# Patient Record
Sex: MALE | Race: WHITE | NOT HISPANIC OR LATINO | Employment: FULL TIME | URBAN - METROPOLITAN AREA
[De-identification: names, ages, dates, MRNs, and addresses within clinical notes are randomized per-mention and may not be internally consistent; named-entity substitution may affect disease eponyms.]

---

## 2017-02-02 ENCOUNTER — GENERIC CONVERSION - ENCOUNTER (OUTPATIENT)
Dept: OTHER | Facility: OTHER | Age: 57
End: 2017-02-02

## 2017-02-08 ENCOUNTER — LAB CONVERSION - ENCOUNTER (OUTPATIENT)
Dept: OTHER | Facility: OTHER | Age: 57
End: 2017-02-08

## 2017-02-08 PROCEDURE — 88305 TISSUE EXAM BY PATHOLOGIST: CPT | Performed by: INTERNAL MEDICINE

## 2017-02-08 PROCEDURE — 88342 IMHCHEM/IMCYTCHM 1ST ANTB: CPT | Performed by: INTERNAL MEDICINE

## 2017-02-09 ENCOUNTER — LAB REQUISITION (OUTPATIENT)
Dept: LAB | Facility: HOSPITAL | Age: 57
End: 2017-02-09
Payer: COMMERCIAL

## 2017-02-09 DIAGNOSIS — K21.9 GASTRO-ESOPHAGEAL REFLUX DISEASE WITHOUT ESOPHAGITIS: ICD-10-CM

## 2017-02-09 DIAGNOSIS — K29.70 GASTRITIS WITHOUT BLEEDING: ICD-10-CM

## 2017-02-09 DIAGNOSIS — K20.90 ESOPHAGITIS: ICD-10-CM

## 2017-02-15 ENCOUNTER — GENERIC CONVERSION - ENCOUNTER (OUTPATIENT)
Dept: OTHER | Facility: OTHER | Age: 57
End: 2017-02-15

## 2017-03-07 ENCOUNTER — LAB REQUISITION (OUTPATIENT)
Dept: LAB | Facility: HOSPITAL | Age: 57
End: 2017-03-07
Payer: COMMERCIAL

## 2017-03-07 ENCOUNTER — ALLSCRIPTS OFFICE VISIT (OUTPATIENT)
Dept: OTHER | Facility: OTHER | Age: 57
End: 2017-03-07

## 2017-03-07 DIAGNOSIS — E55.9 VITAMIN D DEFICIENCY: ICD-10-CM

## 2017-03-07 DIAGNOSIS — K21.9 GASTRO-ESOPHAGEAL REFLUX DISEASE WITHOUT ESOPHAGITIS: ICD-10-CM

## 2017-03-07 DIAGNOSIS — E78.5 HYPERLIPIDEMIA: ICD-10-CM

## 2017-03-07 DIAGNOSIS — E66.9 OBESITY: ICD-10-CM

## 2017-03-07 LAB
25(OH)D3 SERPL-MCNC: 34 NG/ML (ref 30–100)
ALBUMIN SERPL BCP-MCNC: 3.9 G/DL (ref 3.5–5)
ALP SERPL-CCNC: 66 U/L (ref 46–116)
ALT SERPL W P-5'-P-CCNC: 42 U/L (ref 12–78)
ANION GAP SERPL CALCULATED.3IONS-SCNC: 6 MMOL/L (ref 4–13)
AST SERPL W P-5'-P-CCNC: 16 U/L (ref 5–45)
BASOPHILS # BLD AUTO: 0.02 THOUSANDS/ΜL (ref 0–0.1)
BASOPHILS NFR BLD AUTO: 0 % (ref 0–1)
BILIRUB SERPL-MCNC: 0.43 MG/DL (ref 0.2–1)
BILIRUB UR QL STRIP: NEGATIVE
BUN SERPL-MCNC: 16 MG/DL (ref 5–25)
CALCIUM SERPL-MCNC: 9.2 MG/DL (ref 8.3–10.1)
CHLORIDE SERPL-SCNC: 107 MMOL/L (ref 100–108)
CHOLEST SERPL-MCNC: 158 MG/DL (ref 50–200)
CLARITY UR: CLEAR
CO2 SERPL-SCNC: 29 MMOL/L (ref 21–32)
COLOR UR: YELLOW
CREAT SERPL-MCNC: 1.01 MG/DL (ref 0.6–1.3)
EOSINOPHIL # BLD AUTO: 0.23 THOUSAND/ΜL (ref 0–0.61)
EOSINOPHIL NFR BLD AUTO: 4 % (ref 0–6)
ERYTHROCYTE [DISTWIDTH] IN BLOOD BY AUTOMATED COUNT: 12 % (ref 11.6–15.1)
GFR SERPL CREATININE-BSD FRML MDRD: >60 ML/MIN/1.73SQ M
GLUCOSE SERPL-MCNC: 91 MG/DL (ref 65–140)
GLUCOSE UR STRIP-MCNC: NEGATIVE MG/DL
HCT VFR BLD AUTO: 41 % (ref 36.5–49.3)
HDLC SERPL-MCNC: 60 MG/DL (ref 40–60)
HGB BLD-MCNC: 14.9 G/DL (ref 12–17)
HGB UR QL STRIP.AUTO: NEGATIVE
KETONES UR STRIP-MCNC: NEGATIVE MG/DL
LDLC SERPL CALC-MCNC: 79 MG/DL (ref 0–100)
LEUKOCYTE ESTERASE UR QL STRIP: NEGATIVE
LYMPHOCYTES # BLD AUTO: 2.32 THOUSANDS/ΜL (ref 0.6–4.47)
LYMPHOCYTES NFR BLD AUTO: 35 % (ref 14–44)
MCH RBC QN AUTO: 31.7 PG (ref 26.8–34.3)
MCHC RBC AUTO-ENTMCNC: 36.3 G/DL (ref 31.4–37.4)
MCV RBC AUTO: 87 FL (ref 82–98)
MONOCYTES # BLD AUTO: 0.59 THOUSAND/ΜL (ref 0.17–1.22)
MONOCYTES NFR BLD AUTO: 9 % (ref 4–12)
NEUTROPHILS # BLD AUTO: 3.44 THOUSANDS/ΜL (ref 1.85–7.62)
NEUTS SEG NFR BLD AUTO: 52 % (ref 43–75)
NITRITE UR QL STRIP: NEGATIVE
NRBC BLD AUTO-RTO: 0 /100 WBCS
PH UR STRIP.AUTO: 6.5 [PH] (ref 4.5–8)
PLATELET # BLD AUTO: 225 THOUSANDS/UL (ref 149–390)
PMV BLD AUTO: 10.6 FL (ref 8.9–12.7)
POTASSIUM SERPL-SCNC: 4.4 MMOL/L (ref 3.5–5.3)
PROT SERPL-MCNC: 7.2 G/DL (ref 6.4–8.2)
PROT UR STRIP-MCNC: NEGATIVE MG/DL
PSA SERPL-MCNC: 0.8 NG/ML (ref 0–4)
RBC # BLD AUTO: 4.7 MILLION/UL (ref 3.88–5.62)
SODIUM SERPL-SCNC: 142 MMOL/L (ref 136–145)
SP GR UR STRIP.AUTO: 1.01 (ref 1–1.03)
TRIGL SERPL-MCNC: 97 MG/DL
TSH SERPL DL<=0.05 MIU/L-ACNC: 1.39 UIU/ML (ref 0.36–3.74)
UROBILINOGEN UR QL STRIP.AUTO: 0.2 E.U./DL
WBC # BLD AUTO: 6.62 THOUSAND/UL (ref 4.31–10.16)

## 2017-03-07 PROCEDURE — 84443 ASSAY THYROID STIM HORMONE: CPT | Performed by: FAMILY MEDICINE

## 2017-03-07 PROCEDURE — 81003 URINALYSIS AUTO W/O SCOPE: CPT | Performed by: FAMILY MEDICINE

## 2017-03-07 PROCEDURE — G0103 PSA SCREENING: HCPCS | Performed by: FAMILY MEDICINE

## 2017-03-07 PROCEDURE — 85025 COMPLETE CBC W/AUTO DIFF WBC: CPT | Performed by: FAMILY MEDICINE

## 2017-03-07 PROCEDURE — 80061 LIPID PANEL: CPT | Performed by: FAMILY MEDICINE

## 2017-03-07 PROCEDURE — 82306 VITAMIN D 25 HYDROXY: CPT | Performed by: FAMILY MEDICINE

## 2017-03-07 PROCEDURE — 80053 COMPREHEN METABOLIC PANEL: CPT | Performed by: FAMILY MEDICINE

## 2017-03-08 ENCOUNTER — GENERIC CONVERSION - ENCOUNTER (OUTPATIENT)
Dept: OTHER | Facility: OTHER | Age: 57
End: 2017-03-08

## 2018-01-08 ENCOUNTER — ALLSCRIPTS OFFICE VISIT (OUTPATIENT)
Dept: OTHER | Facility: OTHER | Age: 58
End: 2018-01-08

## 2018-01-09 NOTE — PROGRESS NOTES
Assessment   1  GERD without esophagitis (530 81) (K21 9)   2  Hyperlipidemia (272 4) (E78 5)   3  Vitamin D deficiency (268 9) (E55 9)   4  Acute URI (465 9) (J06 9)    Plan   Acute URI    · Zithromax Z-Beau 250 MG Oral Tablet (Azithromycin); TAKE 2 TABLETS ON DAY 1    THEN TAKE 1 TABLET A DAY FOR 4 DAYS  Encounter for prostate cancer screening, GERD without esophagitis, Hyperlipidemia,    Vitamin D deficiency    · (1) PSA (SCREEN) (Dx V76 44 Screen for Prostate Cancer); Status:Active; Requested    for:08Mar2018;   GERD without esophagitis, Hyperlipidemia, Vitamin D deficiency    · (1) CBC/PLT/DIFF; Status:Active; Requested for:08Mar2018;    · (1) COMPREHENSIVE METABOLIC PANEL; Status:Active; Requested for:08Mar2018;    · (1) LIPID PANEL, FASTING; Status:Active; Requested for:08Mar2018;    · (1) TSH WITH FT4 REFLEX; Status:Active; Requested for:08Mar2018;    · (1) URINALYSIS (will reflex a microscopy if leukocytes, occult blood, protein or nitrites are    not within normal limits); Status:Active; Requested for:08Mar2018;    · (1) VITAMIN D 25-HYDROXY; Status:Active; Requested for:08Mar2018;   Hyperlipidemia    · Atorvastatin Calcium 20 MG Oral Tablet; TAKE 1 TABLET DAILY AS DIRECTED   · A diet that is low in fat, cholesterol, and sodium is considered a cardiac diet ;    Status:Complete;   Done: 72FUS4684   · Begin a limited exercise program ; Status:Complete;   Done: 25NCQ7918   · Begin or continue regular aerobic exercise  Gradually work up to at least 3 sessions of    30 minutes of exercise a week ; Status:Complete;   Done: 56JSW3412   · Continue with our present treatment plan ; Status:Complete;   Done: 97FSQ2267   · Eat a low fat and low cholesterol diet ; Status:Complete;   Done: 27VKY1975   · Eat no more than 30 grams of fat per day ; Status:Complete;   Done: 87XNZ1463   · We recommend you modify your diet to achieve and maintain a healthy weight    Being    overweight may increase your risk for developing health problems such as diabetes,    heart disease, and cancer  Avoid high fat foods and eat a balanced diet rich    in fruits and vegetables  The combination of a reduced-calorie diet and increased    physical activity is recommended  Please let us know if you would like to    learn more about your nutrition and calorie needs, and additional options including    weight loss programs that can help you achieve your goals ; Status:Complete;   Done:    76LHR3713    Discussion/Summary      Patient declines flu vaccine today  Patient to return to the office in 2 months for full fasting labs  Patient was started on a Z-Beau and instructed to take Robitussin DM or Mucinex DM p r n  He is encouraged to drink plenty of fluids and rest  Patient to continue his present treatment  Patient instructed to follow a low-fat and a low-salt diet and get regular aerobic exercise 150 minutes per week  Patient to return to the office in 1 year  Possible side effects of new medications were reviewed with the patient/guardian today  The treatment plan was reviewed with the patient/guardian  The patient/guardian understands and agrees with the treatment plan      Chief Complaint   Fasting [facility]    Patient is here today for follow up of chronic conditions described in HPI  History of Present Illness   Sandra Concepcion presents with complaints of cold symptoms  Associated symptoms include post nasal drainage,-- hoarseness,-- dry cough,-- headache-- and-- chills, but-- no sore throat,-- no productive cough,-- no facial pressure,-- no facial pain,-- no ear pain,-- no wheezing,-- no shortness of breath,-- no fatigue,-- no nausea,-- no vomiting-- and-- no diarrhea  The patient presents with complaints of fever, described as > 99 f  Patient is here for routine appointment for chronic conditions and fasting labs  Patient has been feeling well overall until recently developed cold symptoms past 2 days   Patient has been exercising 2 to 3 times a week for 30 minutes primarily weight training  Patient's weight is down approximately 20 lb over the past 2 years through diet and exercise  Patient is up-to-date on colonoscopy  Patient declines flu vaccine  The patient reports doing well  He has had no significant interval events  Interval symptoms:  denies heartburn,-- denies chest pain,-- denies abdominal pain,-- denies acid regurgitation-- and-- denies dysphagia  Associated symptoms: no nausea,-- no vomiting,-- no hematemesis-- and-- no melena--       The patient presents with complaints of 10 - 19 pound weight loss  Medications:  the patient is adherent to his medication regimen, but-- he denies medication side effects  Disease management:  the patient is doing well with his goals  The patient states his hyperlipidemia has been under good control since the last visit  He has no comorbid illnesses  He has no significant interval events  Symptoms: denies chest pain,-- denies intermittent leg claudication,-- denies muscle pain-- and-- denies muscle weakness  Associated symptoms include no memory loss  Medications: the patient is adherent with his medication regimen  -- He denies medication side effects  The patient is doing well with his hyperlipidemia goals  the patient's last LDL was 79 mg/dL  The patient is due for a lipid panel-- and-- liver function tests  The patient is being seen for follow-up of vitamin D deficiency  Disease type: vitamin D deficiency  Current treatment includes dietary vitamin D, dietary calcium, weight bearing exercise and vitamin D3 (cholecalciferol)  Symptoms:  no fatigue,-- no bone pain,-- no muscle pain,-- no muscle weakness,-- no muscle cramps,-- no paresthesias-- and-- no gait abnormality  The patient is currently experiencing symptoms  Review of Systems        Eyes: No complaints of eye pain, no red eyes, no discharge from eyes, no itchy eyes        Cardiovascular: no chest pain,-- no intermittent leg claudication,-- no palpitations-- and-- no extremity edema  Genitourinary: no dysuria,-- no urinary hesitancy-- and-- no nocturia  Hematologic/Lymphatic: No complaints of swollen glands, no swollen glands in the neck, does not bleed easily, no easy bruising  Active Problems   1  Abnormal liver function test (790 6) (R79 89)   2  Fever (780 60) (R50 9)   3  GERD without esophagitis (530 81) (K21 9)   4  Hyperlipidemia (272 4) (E78 5)   5  Obesity (278 00) (E66 9)   6  Renal cyst, right (753 10) (N28 1)   7  Tinea versicolor (111 0) (B36 0)   8  Vitamin D deficiency (268 9) (E55 9)    Family History   Mother    1  Family history of Coronary Artery Disease (V17 49)  Father    2  Family history of Colon Cancer (V16 0)   3  Family history of Father  At Age 46    Social History    · Being A Social Drinker   · Never a smoker   · Never A Smoker    Current Meds    1  Aspirin 81 MG Oral Tablet Chewable; 1 qd; Therapy: 35OFB3751 to Recorded   2  Atorvastatin Calcium 20 MG Oral Tablet; TAKE 1 TABLET DAILY AS DIRECTED; Therapy: 73ESE8877 to (Evaluate:2018)  Requested for: 33EBJ2868; Last     Rx:2017; Status: ACTIVE - Renewal Denied Ordered   3  Econazole Nitrate 1 % External Cream; APPLY AND GENTLY MASSAGE INTO AFFECTED     AREA(S) TWICE DAILY; Therapy: 68LHL8568 to (Last Rx:2017)  Requested for: 93SIQ0680 Ordered   4  Fish Oil 1200 MG Oral Capsule; 1 qd; Therapy: 80ZPA9119 to Recorded   5  Multivitamin TABS; Therapy: (Recorded:2015) to Recorded   6  Omeprazole 20 MG Oral Capsule Delayed Release; Therapy: (21 ) to Recorded   7  Vitamin D3 2000 UNIT Oral Capsule; Therapy: (Recorded:2017) to Recorded    Allergies   1   Sulfa Drugs    Vitals   Vital Signs    Recorded: 91NXR3697 09:28AM Recorded: 42EJS4983 09:04AM   Temperature  99 7 F   Heart Rate 60    Respiration 16    Systolic 665    Diastolic 82 Height  5 ft 7 in   Weight  194 lb    BMI Calculated  30 38   BSA Calculated  2     Physical Exam        Constitutional      General appearance: No acute distress, well appearing and well nourished  Eyes      Conjunctiva and lids: No swelling, erythema, or discharge  Ears, Nose, Mouth, and Throat      External inspection of ears and nose: Normal        Otoscopic examination: Tympanic membrance translucent with normal light reflex  Canals patent without erythema  Nasal mucosa, septum, and turbinates: Abnormal   There was a mucoid discharge from both nares  The bilateral nasal mucosa was edematous  Oropharynx: Abnormal  -- Postnasal drainage and injected  Pulmonary      Respiratory effort: No increased work of breathing or signs of respiratory distress  Auscultation of lungs: Clear to auscultation, equal breath sounds bilaterally, no wheezes, no rales, no rhonci  Cardiovascular      Auscultation of heart: Normal rate and rhythm, normal S1 and S2, without murmurs  Examination of extremities for edema and/or varicosities: Normal        Carotid pulses: Normal        Abdomen      Abdomen: Non-tender, no masses  Lymphatic      Palpation of lymph nodes in neck: No lymphadenopathy  Musculoskeletal      Gait and station: Normal        Inspection/palpation of joints, bones, and muscles: Normal        Skin      Skin and subcutaneous tissue: Normal without rashes or lesions  Psychiatric      Orientation to person, place and time: Normal        Mood and affect: Normal           Health Management   Health Maintenance   COLONOSCOPY; every 5 years; Last 50ZXI7115; Next Due: 74WKC3445;  Active    Signatures    Electronically signed by : Philipp Brown DO; Jan 8 2018  9:38AM EST                       (Author)

## 2018-01-12 NOTE — RESULT NOTES
Verified Results  (1) PSA (SCREEN) (Dx V76 44 Screen for Prostate Cancer) 14Apr2016 09:01AM Fabián Puri Order Number: [id number]     Order Number: [id number]     Test Name Result Flag Reference   PROSTATE SPECIFIC ANTIGEN 0 9 ng/mL  0 0-4 0     (1) CBC/PLT/DIFF 11FZK1093 09:01AM Navi Benz     Test Name Result Flag Reference   WBC COUNT 5 80 Thousand/uL  4 31-10 16   RBC COUNT 4 93 Million/uL  3 88-5 62   HEMOGLOBIN 15 9 g/dL  12 0-17 0   HEMATOCRIT 44 0 %  36 5-49 3   MCV 89 fL  82-98   MCH 32 3 pg  26 8-34 3   MCHC 36 1 g/dL  31 4-37 4   RDW 12 6 %  11 6-15 1   MPV 10 7 fL  8 9-12 7   PLATELET COUNT 055 Thousands/uL  149-390   nRBC AUTOMATED 0 /100 WBCs     NEUTROPHILS RELATIVE PERCENT 56 %  43-75   LYMPHOCYTES RELATIVE PERCENT 31 %  14-44   MONOCYTES RELATIVE PERCENT 10 %  4-12   EOSINOPHILS RELATIVE PERCENT 3 %  0-6   BASOPHILS RELATIVE PERCENT 0 %  0-1   NEUTROPHILS ABSOLUTE COUNT 3 18 Thousands/µL  1 85-7 62   LYMPHOCYTES ABSOLUTE COUNT 1 81 Thousands/µL  0 60-4 47   MONOCYTES ABSOLUTE COUNT 0 58 Thousand/µL  0 17-1 22   EOSINOPHILS ABSOLUTE COUNT 0 20 Thousand/µL  0 00-0 61   BASOPHILS ABSOLUTE COUNT 0 02 Thousands/µL  0 00-0 10     (1) URINALYSIS (will reflex a microscopy if leukocytes, occult blood, protein or nitrites are not within normal limits) 14Apr2016 09:01AM Navi Benz     Test Name Result Flag Reference   COLOR Yellow     CLARITY Cloudy     SPECIFIC GRAVITY UA 1 021  1 003-1 030   PH UA 6 5  4 5-8 0   LEUKOCYTE ESTERASE UA Negative  Negative   NITRITE UA Negative  Negative   PROTEIN UA Negative mg/dl  Negative   GLUCOSE UA Negative mg/dl  Negative   KETONES UA Negative mg/dl  Negative   UROBILINOGEN UA 0 2 E U /dl  0 2, 1 0 E U /dl   BILIRUBIN UA Negative  Negative   BLOOD UA Negative  Negative     (1) TSH WITH FT4 REFLEX 14Apr2016 09:01AM Navi Benz   Patients undergoing fluorescein dye angiography may retain small amounts of fluorescein in the body for 48-72 hours post procedure  Samples containing fluorescein can produce falsely depressed TSH values  If the patient had this procedure,a specimen should be resubmitted post fluorescein clearance  Test Name Result Flag Reference   TSH 2 310 uIU/mL  0 358-3 740     (1) LIPID PANEL, FASTING 14Apr2016 09:01AM Gautam Morris   Triglyceride:         Normal              <150 mg/dl       Borderline High    150-199 mg/dl       High               200-499 mg/dl       Very High          >499 mg/dl  Cholesterol:         Desirable        <200 mg/dl      Borderline High  200-239 mg/dl      High             >239 mg/dl  HDL Cholesterol:        High    >59 mg/dL      Low     <41 mg/dL  LDL CALCULATED:    This screening LDL is a calculated result  It does not have the accuracy of the Direct Measured LDL in the monitoring of patients with hyperlipidemia and/or statin therapy  Direct Measure LDL (KHD325) must be ordered separately in these patients  Test Name Result Flag Reference   CHOLESTEROL 180 mg/dL     HDL,DIRECT 57 mg/dL  40-60   E550   LDL CHOLESTEROL CALCULATED 91 mg/dL  0-100   TRIGLYCERIDES 158 mg/dL H <=150   E511     (1) COMPREHENSIVE METABOLIC PANEL 11IJW2723 89:44TG Gautam Morris   [facility] Kidney Disease Education Program recommendations are as follows:  GFR calculation is accurate only with a steady state creatinine  Chronic Kidney disease less than 60 ml/min/1 73 sq  meters  Kidney failure less than 15 ml/min/1 73 sq  meters  Test Name Result Flag Reference   GLUCOSE,RANDM 91 mg/dL     If the patient is fasting, the ADA then defines impaired fasting glucose as > 100 mg/dL and diabetes as > or equal to 123 mg/dL     SODIUM 143 mmol/L  136-145   POTASSIUM 4 3 mmol/L  3 5-5 3   CHLORIDE 107 mmol/L  100-108   CARBON DIOXIDE 26 mmol/L  21-32   ANION GAP (CALC) 10 mmol/L  4-13   BLOOD UREA NITROGEN 17 mg/dL  5-25   CREATININE 0 98 mg/dL  0 60-1 30   Standardized to IDMS reference method   CALCIUM 9 0 mg/dL  8 3-10 1   BILI, TOTAL 0 54 mg/dL  0 20-1 00   ALK PHOSPHATAS 67 U/L     ALT (SGPT) 42 U/L  12-78   AST(SGOT) 17 U/L  5-45   ALBUMIN 4 2 g/dL  3 5-5 0   TOTAL PROTEIN 7 5 g/dL  6 4-8 2   eGFR Non-African American      >60 0 ml/min/1 73sq m     (1) VITAMIN D 25-HYDROXY 14Apr2016 09:01AM Carol Pang     Test Name Result Flag Reference   VIT D 25-HYDROX 28 9 ng/mL L 30 0-100 0       Discussion/Summary   Labs okay  Continue present treatment

## 2018-01-14 VITALS
DIASTOLIC BLOOD PRESSURE: 82 MMHG | RESPIRATION RATE: 16 BRPM | HEIGHT: 67 IN | TEMPERATURE: 98.1 F | HEART RATE: 56 BPM | SYSTOLIC BLOOD PRESSURE: 134 MMHG | BODY MASS INDEX: 32.18 KG/M2 | WEIGHT: 205 LBS

## 2018-01-14 NOTE — RESULT NOTES
Verified Results  (1) CBC/PLT/DIFF [id number] 09:14AM Mi Coronado Order Number: [id number]     Test Name Result Flag Reference   WBC COUNT 6 62 Thousand/uL  4 31-10 16   RBC COUNT 4 70 Million/uL  3 88-5 62   HEMOGLOBIN 14 9 g/dL  12 0-17 0   HEMATOCRIT 41 0 %  36 5-49 3   MCV 87 fL  82-98   MCH 31 7 pg  26 8-34 3   MCHC 36 3 g/dL  31 4-37 4   RDW 12 0 %  11 6-15 1   MPV 10 6 fL  8 9-12 7   PLATELET COUNT 106 Thousands/uL  149-390   nRBC AUTOMATED 0 /100 WBCs     NEUTROPHILS RELATIVE PERCENT 52 %  43-75   LYMPHOCYTES RELATIVE PERCENT 35 %  14-44   MONOCYTES RELATIVE PERCENT 9 %  4-12   EOSINOPHILS RELATIVE PERCENT 4 %  0-6   BASOPHILS RELATIVE PERCENT 0 %  0-1   NEUTROPHILS ABSOLUTE COUNT 3 44 Thousands/? ??L  1 85-7 62   LYMPHOCYTES ABSOLUTE COUNT 2 32 Thousands/? ??L  0 60-4 47   MONOCYTES ABSOLUTE COUNT 0 59 Thousand/? ??L  0 17-1 22   EOSINOPHILS ABSOLUTE COUNT 0 23 Thousand/? ??L  0 00-0 61   BASOPHILS ABSOLUTE COUNT 0 02 Thousands/? ??L  0 00-0 10   - Patient Instructions: This bloodwork is non-fasting  Please drink two glasses of water morning of bloodwork  (1) COMPREHENSIVE METABOLIC PANEL [id number] 92:19CZ Mi Coronado Order Number: [id number]     Test Name Result Flag Reference   GLUCOSE,RANDM 91 mg/dL     If the patient is fasting, the ADA then defines impaired fasting glucose as > 100 mg/dL and diabetes as > or equal to 123 mg/dL     SODIUM 142 mmol/L  136-145   POTASSIUM 4 4 mmol/L  3 5-5 3   CHLORIDE 107 mmol/L  100-108   CARBON DIOXIDE 29 mmol/L  21-32   ANION GAP (CALC) 6 mmol/L  4-13   BLOOD UREA NITROGEN 16 mg/dL  5-25   CREATININE 1 01 mg/dL  0 60-1 30   Standardized to IDMS reference method   CALCIUM 9 2 mg/dL  8 3-10 1   BILI, TOTAL 0 43 mg/dL  0 20-1 00   ALK PHOSPHATAS 66 U/L     ALT (SGPT) 42 U/L  12-78   AST(SGOT) 16 U/L  5-45   ALBUMIN 3 9 g/dL  3 5-5 0   TOTAL PROTEIN 7 2 g/dL  6 4-8 2   eGFR Non-African American      >60 0 ml/min/1 73sq m National Kidney Disease Education Program recommendations are as follows:  GFR calculation is accurate only with a steady state creatinine  Chronic Kidney disease less than 60 ml/min/1 73 sq  meters  Kidney failure less than 15 ml/min/1 73 sq  meters  (1) LIPID PANEL, FASTING [id number] 09:14AM [facility] Order Number: [id number]     Test Name Result Flag Reference   CHOLESTEROL 158 mg/dL     HDL,DIRECT 60 mg/dL  40-60   Specimen collection should occur prior to Metamizole administration due to the potential for falsely depressed results  LDL CHOLESTEROL CALCULATED 79 mg/dL  0-100   - Patient Instructions: This is a fasting blood test  Water,black tea or black  coffee only after 9:00pm the night before test   Drink 2 glasses of water the morning of test       Triglyceride:         Normal              <150 mg/dl       Borderline High    150-199 mg/dl       High               200-499 mg/dl       Very High          >499 mg/dl  Cholesterol:         Desirable        <200 mg/dl      Borderline High  200-239 mg/dl      High             >239 mg/dl  HDL Cholesterol:        High    >59 mg/dL      Low     <41 mg/dL  LDL CALCULATED:    This screening LDL is a calculated result  It does not have the accuracy of the Direct Measured LDL in the monitoring of patients with hyperlipidemia and/or statin therapy  Direct Measure LDL (CGY908) must be ordered separately in these patients  TRIGLYCERIDES 97 mg/dL  <=150   Specimen collection should occur prior to N-Acetylcysteine or Metamizole administration due to the potential for falsely depressed results  (1) TSH WITH FT4 REFLEX [id number] 09:14AM [facility] [employer] Order Number: [id number]     Test Name Result Flag Reference   TSH 1 390 uIU/mL  0 358-3 740   Patients undergoing fluorescein dye angiography may retain small amounts of fluorescein in the body for 48-72 hours post procedure   Samples containing fluorescein can produce falsely depressed TSH values  If the patient had this procedure,a specimen should be resubmitted post fluorescein clearance  (1) VITAMIN D 25-HYDROXY [id number] 09:14AM [facility] Order Number: [id number]     Test Name Result Flag Reference   VIT D 25-HYDROX 34 0 ng/mL  30 0-100 0   This assay is a certified procedure of the CDC Vitamin D Standardization Certification Program (VDSCP)     Deficiency <20ng/ml   Insufficiency 20-30ng/ml   Sufficient  ng/ml     *Patients undergoing fluorescein dye angiography may retain small amounts of fluorescein in the body for 48-72 hours post procedure  Samples containing fluorescein can produce falsely elevated Vitamin D values  If the patient had this procedure, a specimen should be resubmitted post fluorescein clearance  (1) URINALYSIS (will reflex a microscopy if leukocytes, occult blood, protein or nitrites are not within normal limits) [id number] 09:14AM [facility] Order Number: [id number]     Test Name Result Flag Reference   COLOR Yellow     CLARITY Clear     SPECIFIC GRAVITY UA 1 015  1 003-1 030   PH UA 6 5  4 5-8 0   LEUKOCYTE ESTERASE UA Negative  Negative   NITRITE UA Negative  Negative   PROTEIN UA Negative mg/dl  Negative   GLUCOSE UA Negative mg/dl  Negative   KETONES UA Negative mg/dl  Negative   UROBILINOGEN UA 0 2 E U /dl  0 2, 1 0 E U /dl   BILIRUBIN UA Negative  Negative   BLOOD UA Negative  Negative     (1) PSA (SCREEN) (Dx V76 44 Screen for Prostate Cancer) [id number] 09:14AM [facility] Order Number: [id number]     Test Name Result Flag Reference   PROSTATE SPECIFIC ANTIGEN 0 8 ng/mL  0 0-4 0   American Urological Association Guidelines define biochemical recurrence of prostate cancer as a detectable or rising PSA value post-radical prostatectomy that is greater than or equal to 0 2 ng/mL with a second confirmatory level of greater than or equal to 0 2 ng/mL     - Patient Instructions: This test is non-fasting  Please drink two glasses of water morning of bloodwork  Discussion/Summary   Labs normal  Cont present Tx

## 2018-01-23 VITALS
TEMPERATURE: 99.7 F | DIASTOLIC BLOOD PRESSURE: 82 MMHG | WEIGHT: 194 LBS | HEART RATE: 60 BPM | RESPIRATION RATE: 16 BRPM | SYSTOLIC BLOOD PRESSURE: 132 MMHG | BODY MASS INDEX: 30.45 KG/M2 | HEIGHT: 67 IN

## 2018-03-08 DIAGNOSIS — Z12.5 ENCOUNTER FOR SCREENING FOR MALIGNANT NEOPLASM OF PROSTATE: ICD-10-CM

## 2018-03-08 DIAGNOSIS — E55.9 VITAMIN D DEFICIENCY: ICD-10-CM

## 2018-03-08 DIAGNOSIS — E78.5 HYPERLIPIDEMIA: ICD-10-CM

## 2018-03-08 DIAGNOSIS — K21.9 GASTRO-ESOPHAGEAL REFLUX DISEASE WITHOUT ESOPHAGITIS: ICD-10-CM

## 2018-04-20 ENCOUNTER — TELEPHONE (OUTPATIENT)
Dept: FAMILY MEDICINE CLINIC | Facility: CLINIC | Age: 58
End: 2018-04-20

## 2019-01-06 DIAGNOSIS — E78.5 HYPERLIPIDEMIA, UNSPECIFIED HYPERLIPIDEMIA TYPE: Primary | ICD-10-CM

## 2019-01-06 RX ORDER — ATORVASTATIN CALCIUM 20 MG/1
TABLET, FILM COATED ORAL
Qty: 90 TABLET | Refills: 3 | Status: SHIPPED | OUTPATIENT
Start: 2019-01-06 | End: 2020-01-03

## 2019-01-21 RX ORDER — OMEPRAZOLE 20 MG/1
CAPSULE, DELAYED RELEASE ORAL
COMMUNITY
End: 2019-01-24 | Stop reason: SDUPTHER

## 2019-01-21 RX ORDER — ACETAMINOPHEN 160 MG
3000 TABLET,DISINTEGRATING ORAL
COMMUNITY

## 2019-01-21 RX ORDER — OMEPRAZOLE 20 MG/1
CAPSULE, DELAYED RELEASE ORAL
COMMUNITY
Start: 2018-11-26 | End: 2021-01-28

## 2019-01-21 RX ORDER — ELECTROLYTES/DEXTROSE
SOLUTION, ORAL ORAL
COMMUNITY

## 2019-01-21 RX ORDER — AZITHROMYCIN 250 MG/1
TABLET, FILM COATED ORAL DAILY
COMMUNITY
Start: 2018-01-08 | End: 2019-01-24 | Stop reason: ALTCHOICE

## 2019-01-21 RX ORDER — ATORVASTATIN CALCIUM 20 MG/1
1 TABLET, FILM COATED ORAL DAILY
COMMUNITY
Start: 2012-05-07 | End: 2019-01-24 | Stop reason: SDUPTHER

## 2019-01-21 RX ORDER — AMOXICILLIN 500 MG
CAPSULE ORAL DAILY
COMMUNITY
Start: 2014-07-17

## 2019-01-21 RX ORDER — ASPIRIN 81 MG/1
TABLET, CHEWABLE ORAL DAILY
COMMUNITY
Start: 2014-07-17

## 2019-01-24 ENCOUNTER — OFFICE VISIT (OUTPATIENT)
Dept: FAMILY MEDICINE CLINIC | Facility: CLINIC | Age: 59
End: 2019-01-24
Payer: COMMERCIAL

## 2019-01-24 VITALS
BODY MASS INDEX: 32.47 KG/M2 | TEMPERATURE: 98.9 F | HEART RATE: 72 BPM | SYSTOLIC BLOOD PRESSURE: 132 MMHG | DIASTOLIC BLOOD PRESSURE: 82 MMHG | RESPIRATION RATE: 12 BRPM | HEIGHT: 66 IN | WEIGHT: 202 LBS

## 2019-01-24 DIAGNOSIS — K21.9 GERD WITHOUT ESOPHAGITIS: ICD-10-CM

## 2019-01-24 DIAGNOSIS — E66.09 CLASS 1 OBESITY DUE TO EXCESS CALORIES WITH SERIOUS COMORBIDITY AND BODY MASS INDEX (BMI) OF 32.0 TO 32.9 IN ADULT: ICD-10-CM

## 2019-01-24 DIAGNOSIS — E78.5 HYPERLIPIDEMIA, UNSPECIFIED HYPERLIPIDEMIA TYPE: Primary | ICD-10-CM

## 2019-01-24 DIAGNOSIS — E55.9 VITAMIN D DEFICIENCY: ICD-10-CM

## 2019-01-24 DIAGNOSIS — Z12.5 SCREENING PSA (PROSTATE SPECIFIC ANTIGEN): ICD-10-CM

## 2019-01-24 LAB
25(OH)D3 SERPL-MCNC: 40.3 NG/ML (ref 30–100)
ALBUMIN SERPL BCP-MCNC: 4.4 G/DL (ref 3.5–5)
ALP SERPL-CCNC: 71 U/L (ref 46–116)
ALT SERPL W P-5'-P-CCNC: 32 U/L (ref 12–78)
ANION GAP SERPL CALCULATED.3IONS-SCNC: 9 MMOL/L (ref 4–13)
AST SERPL W P-5'-P-CCNC: 17 U/L (ref 5–45)
BILIRUB SERPL-MCNC: 0.6 MG/DL (ref 0.2–1)
BILIRUB UR QL STRIP: NEGATIVE
BUN SERPL-MCNC: 15 MG/DL (ref 5–25)
CALCIUM SERPL-MCNC: 9.6 MG/DL (ref 8.3–10.1)
CHLORIDE SERPL-SCNC: 104 MMOL/L (ref 100–108)
CHOLEST SERPL-MCNC: 194 MG/DL (ref 50–200)
CLARITY UR: CLEAR
CO2 SERPL-SCNC: 25 MMOL/L (ref 21–32)
COLOR UR: YELLOW
CREAT SERPL-MCNC: 0.98 MG/DL (ref 0.6–1.3)
ERYTHROCYTE [DISTWIDTH] IN BLOOD BY AUTOMATED COUNT: 12 % (ref 11.6–15.1)
GFR SERPL CREATININE-BSD FRML MDRD: 85 ML/MIN/1.73SQ M
GLUCOSE P FAST SERPL-MCNC: 100 MG/DL (ref 65–99)
GLUCOSE UR STRIP-MCNC: NEGATIVE MG/DL
HCT VFR BLD AUTO: 45.9 % (ref 36.5–49.3)
HDLC SERPL-MCNC: 59 MG/DL (ref 40–60)
HGB BLD-MCNC: 15.9 G/DL (ref 12–17)
HGB UR QL STRIP.AUTO: NEGATIVE
KETONES UR STRIP-MCNC: NEGATIVE MG/DL
LDLC SERPL CALC-MCNC: 109 MG/DL (ref 0–100)
LEUKOCYTE ESTERASE UR QL STRIP: NEGATIVE
MCH RBC QN AUTO: 31.7 PG (ref 26.8–34.3)
MCHC RBC AUTO-ENTMCNC: 34.6 G/DL (ref 31.4–37.4)
MCV RBC AUTO: 92 FL (ref 82–98)
NITRITE UR QL STRIP: NEGATIVE
NONHDLC SERPL-MCNC: 135 MG/DL
PH UR STRIP.AUTO: 7 [PH] (ref 4.5–8)
PLATELET # BLD AUTO: 247 THOUSANDS/UL (ref 149–390)
PMV BLD AUTO: 10.4 FL (ref 8.9–12.7)
POTASSIUM SERPL-SCNC: 4.3 MMOL/L (ref 3.5–5.3)
PROT SERPL-MCNC: 7.5 G/DL (ref 6.4–8.2)
PROT UR STRIP-MCNC: NEGATIVE MG/DL
PSA SERPL-MCNC: 0.9 NG/ML (ref 0–4)
RBC # BLD AUTO: 5.01 MILLION/UL (ref 3.88–5.62)
SODIUM SERPL-SCNC: 138 MMOL/L (ref 136–145)
SP GR UR STRIP.AUTO: 1.02 (ref 1–1.03)
TRIGL SERPL-MCNC: 129 MG/DL
TSH SERPL DL<=0.05 MIU/L-ACNC: 1.15 UIU/ML (ref 0.36–3.74)
UROBILINOGEN UR QL STRIP.AUTO: 0.2 E.U./DL
WBC # BLD AUTO: 7.78 THOUSAND/UL (ref 4.31–10.16)

## 2019-01-24 PROCEDURE — 99214 OFFICE O/P EST MOD 30 MIN: CPT | Performed by: FAMILY MEDICINE

## 2019-01-24 PROCEDURE — 85027 COMPLETE CBC AUTOMATED: CPT | Performed by: FAMILY MEDICINE

## 2019-01-24 PROCEDURE — 80061 LIPID PANEL: CPT | Performed by: FAMILY MEDICINE

## 2019-01-24 PROCEDURE — G0103 PSA SCREENING: HCPCS | Performed by: FAMILY MEDICINE

## 2019-01-24 PROCEDURE — 84443 ASSAY THYROID STIM HORMONE: CPT | Performed by: FAMILY MEDICINE

## 2019-01-24 PROCEDURE — 36415 COLL VENOUS BLD VENIPUNCTURE: CPT | Performed by: FAMILY MEDICINE

## 2019-01-24 PROCEDURE — 3008F BODY MASS INDEX DOCD: CPT | Performed by: FAMILY MEDICINE

## 2019-01-24 PROCEDURE — 81003 URINALYSIS AUTO W/O SCOPE: CPT | Performed by: FAMILY MEDICINE

## 2019-01-24 PROCEDURE — 82306 VITAMIN D 25 HYDROXY: CPT | Performed by: FAMILY MEDICINE

## 2019-01-24 PROCEDURE — 80053 COMPREHEN METABOLIC PANEL: CPT | Performed by: FAMILY MEDICINE

## 2019-01-24 PROCEDURE — 1036F TOBACCO NON-USER: CPT | Performed by: FAMILY MEDICINE

## 2019-01-24 NOTE — PROGRESS NOTES
Assessment/Plan:  Patient declines flu vaccine  Fasting labs drawn as below  Continue present treatment  Follow up with Gastroenterology and Dermatology as scheduled  Return the office in 1 year  GERD without esophagitis  Clinically stable on omeprazole 20 mg daily  Continue present treatment and follow up with Gastroenterology as scheduled  Hyperlipidemia  Lipids well controlled on atorvastatin 20 mg daily  Fasting lipid profile drawn today  Follow a low-fat and low-cholesterol diet and regular exercise 150 min per week  Obesity  Weight loss encouraged  Vitamin D deficiency  Continue vitamin-D supplement  Diagnoses and all orders for this visit:    Hyperlipidemia, unspecified hyperlipidemia type  -     Comprehensive metabolic panel  -     Lipid panel  -     TSH, 3rd generation with Free T4 reflex  -     UA (URINE) with reflex to Microscopic    GERD without esophagitis  -     CBC and Platelet    Vitamin D deficiency  -     Vitamin D 25 hydroxy    Class 1 obesity due to excess calories with serious comorbidity and body mass index (BMI) of 32 0 to 32 9 in adult    Screening PSA (prostate specific antigen)  -     PSA, Total Screen    Other orders  -     aspirin 81 mg chewable tablet; Chew daily  -     econazole nitrate 1 % cream; Apply topically 2 (two) times a day as needed    -     Omega-3 Fatty Acids (FISH OIL) 1200 MG CAPS; Take by mouth daily  -     Multiple Vitamins-Minerals (MULTIVITAMIN ADULT) TABS; Take by mouth  -     omeprazole (PriLOSEC) 20 mg delayed release capsule;   -     Discontinue: omeprazole (PriLOSEC) 20 mg delayed release capsule; Take by mouth  -     Cholecalciferol (VITAMIN D3) 2000 units capsule; Take by mouth  -     Discontinue: azithromycin (ZITHROMAX Z-HARRISON) 250 mg tablet; Take by mouth daily  -     Discontinue: atorvastatin (LIPITOR) 20 mg tablet; Take 1 tablet by mouth daily  -     Cyanocobalamin (VITAMIN B-12 CR PO);  Take by mouth          Subjective:      Patient ID: Yannick Cancer is a 63 y o  male  Patient is here for routine appointment for chronic conditions and fasting labs  Patient declines flu vaccine  Patient has been feeling well overall and has been exercising regularly walking 3 to 4 times a week for half an hour  Patient is up-to-date on colonoscopy and has a follow-up appointment with [facility] in February 2019  He is status post EGD in 2017  Patient follows with [facility] every 6 months and has his next appointment in May of 2019 and states he had basal cell carcinoma on his back  Hyperlipidemia   This is a chronic problem  The problem is controlled  Recent lipid tests were reviewed and are normal  Exacerbating diseases include obesity  He has no history of diabetes or hypothyroidism  Pertinent negatives include no chest pain, focal sensory loss, focal weakness, leg pain, myalgias or shortness of breath  Current antihyperlipidemic treatment includes statins  The current treatment provides significant improvement of lipids  There are no compliance problems  Risk factors for coronary artery disease include dyslipidemia, family history, male sex and obesity  Heartburn   He complains of heartburn  He reports no abdominal pain, no belching, no chest pain, no choking, no coughing, no dysphagia, no early satiety, no globus sensation, no hoarse voice or no nausea  The problem occurs rarely  The problem has been gradually improving  The heartburn wakes him from sleep  The heartburn does not limit his activity  The symptoms are aggravated by certain foods, caffeine and lying down  Pertinent negatives include no anemia, fatigue, melena, muscle weakness, orthopnea or weight loss  He has tried a PPI for the symptoms  The treatment provided significant relief  Past procedures include an EGD         The following portions of the patient's history were reviewed and updated as appropriate: allergies, current medications, past family history, past medical history, past social history, past surgical history and problem list     Review of Systems   Constitutional: Negative for fatigue and weight loss  HENT: Negative for hoarse voice  Respiratory: Negative for cough, choking and shortness of breath  Cardiovascular: Negative for chest pain  Gastrointestinal: Positive for heartburn  Negative for abdominal pain, blood in stool, constipation, diarrhea, dysphagia, melena, nausea and vomiting  Genitourinary: Negative for difficulty urinating, dysuria, frequency, hematuria and urgency  Musculoskeletal: Negative for myalgias and muscle weakness  Neurological: Negative for focal weakness  Objective:      /82 (BP Location: Left arm, Patient Position: Sitting, Cuff Size: Large)   Pulse 72   Temp 98 9 °F (37 2 °C) (Tympanic)   Resp 12   Ht 5' 6" (1 676 m)   Wt 91 6 kg (202 lb)   BMI 32 60 kg/m²          Physical Exam   Constitutional: He is oriented to person, place, and time  He appears well-developed and well-nourished  HENT:   Head: Normocephalic  Right Ear: External ear normal    Left Ear: External ear normal    Nose: Nose normal    Mouth/Throat: Oropharynx is clear and moist    Eyes: Conjunctivae are normal  No scleral icterus  Neck: Neck supple  Cardiovascular: Normal rate and regular rhythm  Pulmonary/Chest: Effort normal and breath sounds normal    Abdominal: Soft  There is no tenderness  Musculoskeletal: He exhibits no edema  Lymphadenopathy:     He has no cervical adenopathy  Neurological: He is alert and oriented to person, place, and time  Skin: Skin is warm and dry  Psychiatric: He has a normal mood and affect

## 2019-01-24 NOTE — ASSESSMENT & PLAN NOTE
Clinically stable on omeprazole 20 mg daily  Continue present treatment and follow up with Gastroenterology as scheduled

## 2019-01-24 NOTE — ASSESSMENT & PLAN NOTE
Lipids well controlled on atorvastatin 20 mg daily  Fasting lipid profile drawn today  Follow a low-fat and low-cholesterol diet and regular exercise 150 min per week

## 2020-01-03 DIAGNOSIS — E78.5 HYPERLIPIDEMIA, UNSPECIFIED HYPERLIPIDEMIA TYPE: ICD-10-CM

## 2020-01-03 RX ORDER — ATORVASTATIN CALCIUM 20 MG/1
TABLET, FILM COATED ORAL
Qty: 90 TABLET | Refills: 0 | Status: SHIPPED | OUTPATIENT
Start: 2020-01-03 | End: 2020-04-02

## 2020-01-28 ENCOUNTER — OFFICE VISIT (OUTPATIENT)
Dept: FAMILY MEDICINE CLINIC | Facility: CLINIC | Age: 60
End: 2020-01-28
Payer: COMMERCIAL

## 2020-01-28 VITALS
RESPIRATION RATE: 16 BRPM | OXYGEN SATURATION: 96 % | DIASTOLIC BLOOD PRESSURE: 84 MMHG | HEIGHT: 67 IN | SYSTOLIC BLOOD PRESSURE: 138 MMHG | BODY MASS INDEX: 32.18 KG/M2 | WEIGHT: 205 LBS | HEART RATE: 60 BPM | TEMPERATURE: 98.2 F

## 2020-01-28 DIAGNOSIS — E66.09 CLASS 1 OBESITY DUE TO EXCESS CALORIES WITH SERIOUS COMORBIDITY AND BODY MASS INDEX (BMI) OF 32.0 TO 32.9 IN ADULT: ICD-10-CM

## 2020-01-28 DIAGNOSIS — Z12.5 SCREENING PSA (PROSTATE SPECIFIC ANTIGEN): ICD-10-CM

## 2020-01-28 DIAGNOSIS — E55.9 VITAMIN D DEFICIENCY: ICD-10-CM

## 2020-01-28 DIAGNOSIS — E78.5 HYPERLIPIDEMIA, UNSPECIFIED HYPERLIPIDEMIA TYPE: ICD-10-CM

## 2020-01-28 DIAGNOSIS — K21.9 GERD WITHOUT ESOPHAGITIS: ICD-10-CM

## 2020-01-28 DIAGNOSIS — Z00.00 ANNUAL PHYSICAL EXAM: Primary | ICD-10-CM

## 2020-01-28 LAB
25(OH)D3 SERPL-MCNC: 26.4 NG/ML (ref 30–100)
ALBUMIN SERPL BCP-MCNC: 4 G/DL (ref 3.5–5)
ALP SERPL-CCNC: 74 U/L (ref 46–116)
ALT SERPL W P-5'-P-CCNC: 34 U/L (ref 12–78)
ANION GAP SERPL CALCULATED.3IONS-SCNC: 6 MMOL/L (ref 4–13)
AST SERPL W P-5'-P-CCNC: 13 U/L (ref 5–45)
BILIRUB SERPL-MCNC: 0.43 MG/DL (ref 0.2–1)
BILIRUB UR QL STRIP: NEGATIVE
BUN SERPL-MCNC: 16 MG/DL (ref 5–25)
CALCIUM SERPL-MCNC: 8.8 MG/DL (ref 8.3–10.1)
CHLORIDE SERPL-SCNC: 109 MMOL/L (ref 100–108)
CHOLEST SERPL-MCNC: 171 MG/DL (ref 50–200)
CLARITY UR: CLEAR
CO2 SERPL-SCNC: 28 MMOL/L (ref 21–32)
COLOR UR: YELLOW
CREAT SERPL-MCNC: 0.91 MG/DL (ref 0.6–1.3)
ERYTHROCYTE [DISTWIDTH] IN BLOOD BY AUTOMATED COUNT: 12 % (ref 11.6–15.1)
GFR SERPL CREATININE-BSD FRML MDRD: 93 ML/MIN/1.73SQ M
GLUCOSE P FAST SERPL-MCNC: 88 MG/DL (ref 65–99)
GLUCOSE UR STRIP-MCNC: NEGATIVE MG/DL
HCT VFR BLD AUTO: 44.4 % (ref 36.5–49.3)
HDLC SERPL-MCNC: 61 MG/DL
HGB BLD-MCNC: 15.6 G/DL (ref 12–17)
HGB UR QL STRIP.AUTO: NEGATIVE
KETONES UR STRIP-MCNC: NEGATIVE MG/DL
LDLC SERPL CALC-MCNC: 88 MG/DL (ref 0–100)
LEUKOCYTE ESTERASE UR QL STRIP: NEGATIVE
MCH RBC QN AUTO: 31.8 PG (ref 26.8–34.3)
MCHC RBC AUTO-ENTMCNC: 35.1 G/DL (ref 31.4–37.4)
MCV RBC AUTO: 91 FL (ref 82–98)
NITRITE UR QL STRIP: NEGATIVE
NONHDLC SERPL-MCNC: 110 MG/DL
PH UR STRIP.AUTO: 6.5 [PH]
PLATELET # BLD AUTO: 213 THOUSANDS/UL (ref 149–390)
PMV BLD AUTO: 10.8 FL (ref 8.9–12.7)
POTASSIUM SERPL-SCNC: 4.3 MMOL/L (ref 3.5–5.3)
PROT SERPL-MCNC: 7.5 G/DL (ref 6.4–8.2)
PROT UR STRIP-MCNC: NEGATIVE MG/DL
PSA SERPL-MCNC: 0.8 NG/ML (ref 0–4)
RBC # BLD AUTO: 4.9 MILLION/UL (ref 3.88–5.62)
SODIUM SERPL-SCNC: 143 MMOL/L (ref 136–145)
SP GR UR STRIP.AUTO: 1.02 (ref 1–1.03)
TRIGL SERPL-MCNC: 111 MG/DL
TSH SERPL DL<=0.05 MIU/L-ACNC: 1.5 UIU/ML (ref 0.36–3.74)
UROBILINOGEN UR QL STRIP.AUTO: 0.2 E.U./DL
WBC # BLD AUTO: 8.72 THOUSAND/UL (ref 4.31–10.16)

## 2020-01-28 PROCEDURE — 3008F BODY MASS INDEX DOCD: CPT | Performed by: FAMILY MEDICINE

## 2020-01-28 PROCEDURE — 84443 ASSAY THYROID STIM HORMONE: CPT | Performed by: FAMILY MEDICINE

## 2020-01-28 PROCEDURE — 99396 PREV VISIT EST AGE 40-64: CPT | Performed by: FAMILY MEDICINE

## 2020-01-28 PROCEDURE — 81003 URINALYSIS AUTO W/O SCOPE: CPT | Performed by: FAMILY MEDICINE

## 2020-01-28 PROCEDURE — 36415 COLL VENOUS BLD VENIPUNCTURE: CPT | Performed by: FAMILY MEDICINE

## 2020-01-28 PROCEDURE — 80061 LIPID PANEL: CPT | Performed by: FAMILY MEDICINE

## 2020-01-28 PROCEDURE — 80053 COMPREHEN METABOLIC PANEL: CPT | Performed by: FAMILY MEDICINE

## 2020-01-28 PROCEDURE — 85027 COMPLETE CBC AUTOMATED: CPT | Performed by: FAMILY MEDICINE

## 2020-01-28 PROCEDURE — G0103 PSA SCREENING: HCPCS | Performed by: FAMILY MEDICINE

## 2020-01-28 PROCEDURE — 82306 VITAMIN D 25 HYDROXY: CPT | Performed by: FAMILY MEDICINE

## 2020-01-28 NOTE — PROGRESS NOTES
Assessment/Plan:  Fasting labs drawn as below  Patient to continue present treatment  He is instructed to follow a low-fat, low-salt and a low-carbohydrate diet and get regular aerobic exercise 150 minutes per week  Weight loss encouraged and discussed losing 10% of body weight or approximately 20 lb over the next 6 months  Return to the office in 1 year  Diagnoses and all orders for this visit:    Annual physical exam    Hyperlipidemia, unspecified hyperlipidemia type  -     CBC and Platelet  -     Comprehensive metabolic panel  -     Lipid panel  -     TSH, 3rd generation with Free T4 reflex  -     UA w Reflex to Microscopic w Reflex to Culture - Clinic Collect    GERD without esophagitis    Class 1 obesity due to excess calories with serious comorbidity and body mass index (BMI) of 32 0 to 32 9 in adult  -     TSH, 3rd generation with Free T4 reflex    Vitamin D deficiency  -     Vitamin D 25 hydroxy    Screening PSA (prostate specific antigen)  -     PSA, Total Screen          Subjective:      Patient ID: Jose Mcdonald is a 63 y o  male  Patient is a 60-year-old male who is here for annual physical exam and follow-up for chronic conditions and fasting labs  Patient has been feeling well overall  No regular exercise program and patient admits to being less active during the winter and 10 lb weight gain  Patient did receive yearly flu vaccine at work on 10/07/2019  Patient will be due for follow-up colonoscopy in March of this year with [facility] and plans to schedule  Patient follows with [facility] yearly  Hyperlipidemia   This is a chronic problem  The problem is controlled  Exacerbating diseases include obesity  He has no history of diabetes or hypothyroidism  Pertinent negatives include no chest pain, focal sensory loss, focal weakness, leg pain, myalgias or shortness of breath  Current antihyperlipidemic treatment includes statins   The current treatment provides significant improvement of lipids  Compliance problems include adherence to exercise  Risk factors for coronary artery disease include dyslipidemia, male sex, obesity and family history  Heartburn   He complains of belching and heartburn  He reports no abdominal pain, no chest pain, no choking, no coughing, no dysphagia, no early satiety, no globus sensation, no hoarse voice, no nausea or no wheezing  The problem occurs occasionally  The problem has been gradually improving  The heartburn wakes him from sleep  The heartburn does not limit his activity  The symptoms are aggravated by certain foods and lying down  Pertinent negatives include no anemia, fatigue, melena, muscle weakness, orthopnea or weight loss  He has tried a PPI and an antacid for the symptoms  The treatment provided significant relief  Past procedures include an EGD  The following portions of the patient's history were reviewed and updated as appropriate: allergies, current medications, past family history, past medical history, past social history, past surgical history and problem list     Review of Systems   Constitutional: Negative for activity change, appetite change, fatigue, unexpected weight change and weight loss  HENT: Negative  Negative for hoarse voice  Eyes: Negative  Respiratory: Negative for cough, choking, chest tightness, shortness of breath and wheezing  Cardiovascular: Negative for chest pain, palpitations and leg swelling  Gastrointestinal: Positive for heartburn  Negative for abdominal pain, blood in stool, constipation, diarrhea, dysphagia, melena, nausea and vomiting  Genitourinary: Negative for difficulty urinating, dysuria, frequency, hematuria and urgency  Musculoskeletal: Negative for arthralgias, back pain, gait problem, joint swelling, myalgias, muscle weakness, neck pain and neck stiffness  Skin: Negative      Neurological: Negative for dizziness, focal weakness, syncope, weakness, light-headedness and headaches  Hematological: Negative for adenopathy  Does not bruise/bleed easily  Psychiatric/Behavioral: Negative for decreased concentration, dysphoric mood and sleep disturbance  The patient is not nervous/anxious  Objective:      /84   Pulse 60   Temp 98 2 °F (36 8 °C) (Temporal)   Resp 16   Ht 5' 6 54" (1 69 m)   Wt 93 kg (205 lb)   SpO2 96%   BMI 32 56 kg/m²          Physical Exam   Constitutional: He is oriented to person, place, and time  He appears well-developed and well-nourished  HENT:   Head: Normocephalic  Right Ear: External ear normal    Left Ear: External ear normal    Nose: Nose normal    Mouth/Throat: Oropharynx is clear and moist    Eyes: Conjunctivae are normal  No scleral icterus  Neck: Neck supple  No thyromegaly present  Cardiovascular: Normal rate and regular rhythm  Pulmonary/Chest: Effort normal and breath sounds normal    Abdominal: Soft  There is no tenderness  Musculoskeletal: He exhibits no edema  Lymphadenopathy:     He has no cervical adenopathy  Neurological: He is alert and oriented to person, place, and time  Skin: Skin is warm and dry  Psychiatric: He has a normal mood and affect  BMI Counseling: Body mass index is 32 56 kg/m²  The BMI is above normal  Nutrition recommendations include reducing portion sizes, decreasing overall calorie intake, 3-5 servings of fruits/vegetables daily, reducing fast food intake, consuming healthier snacks, decreasing soda and/or juice intake, moderation in carbohydrate intake and reducing intake of saturated fat and trans fat  Exercise recommendations include moderate aerobic physical activity for 150 minutes/week

## 2020-04-02 DIAGNOSIS — E78.5 HYPERLIPIDEMIA, UNSPECIFIED HYPERLIPIDEMIA TYPE: ICD-10-CM

## 2020-04-02 RX ORDER — ATORVASTATIN CALCIUM 20 MG/1
TABLET, FILM COATED ORAL
Qty: 90 TABLET | Refills: 3 | Status: SHIPPED | OUTPATIENT
Start: 2020-04-02 | End: 2021-03-28

## 2021-01-28 ENCOUNTER — OFFICE VISIT (OUTPATIENT)
Dept: FAMILY MEDICINE CLINIC | Facility: CLINIC | Age: 61
End: 2021-01-28
Payer: COMMERCIAL

## 2021-01-28 VITALS
HEART RATE: 56 BPM | DIASTOLIC BLOOD PRESSURE: 70 MMHG | HEIGHT: 67 IN | SYSTOLIC BLOOD PRESSURE: 120 MMHG | RESPIRATION RATE: 16 BRPM | BODY MASS INDEX: 31.67 KG/M2 | TEMPERATURE: 97.5 F | WEIGHT: 201.8 LBS | OXYGEN SATURATION: 98 %

## 2021-01-28 DIAGNOSIS — E78.5 HYPERLIPIDEMIA, UNSPECIFIED HYPERLIPIDEMIA TYPE: ICD-10-CM

## 2021-01-28 DIAGNOSIS — E66.09 CLASS 1 OBESITY DUE TO EXCESS CALORIES WITH SERIOUS COMORBIDITY AND BODY MASS INDEX (BMI) OF 32.0 TO 32.9 IN ADULT: ICD-10-CM

## 2021-01-28 DIAGNOSIS — E55.9 VITAMIN D DEFICIENCY: ICD-10-CM

## 2021-01-28 DIAGNOSIS — Z00.00 ANNUAL PHYSICAL EXAM: Primary | ICD-10-CM

## 2021-01-28 DIAGNOSIS — Z12.5 SCREENING PSA (PROSTATE SPECIFIC ANTIGEN): ICD-10-CM

## 2021-01-28 DIAGNOSIS — K21.9 GERD WITHOUT ESOPHAGITIS: ICD-10-CM

## 2021-01-28 LAB
25(OH)D3 SERPL-MCNC: 34.4 NG/ML (ref 30–100)
ALBUMIN SERPL BCP-MCNC: 4.3 G/DL (ref 3.5–5)
ALP SERPL-CCNC: 78 U/L (ref 46–116)
ALT SERPL W P-5'-P-CCNC: 30 U/L (ref 12–78)
ANION GAP SERPL CALCULATED.3IONS-SCNC: 5 MMOL/L (ref 4–13)
AST SERPL W P-5'-P-CCNC: 15 U/L (ref 5–45)
BILIRUB SERPL-MCNC: 0.4 MG/DL (ref 0.2–1)
BILIRUB UR QL STRIP: NEGATIVE
BUN SERPL-MCNC: 15 MG/DL (ref 5–25)
CALCIUM SERPL-MCNC: 9.2 MG/DL (ref 8.3–10.1)
CHLORIDE SERPL-SCNC: 110 MMOL/L (ref 100–108)
CHOLEST SERPL-MCNC: 184 MG/DL (ref 50–200)
CLARITY UR: CLEAR
CO2 SERPL-SCNC: 27 MMOL/L (ref 21–32)
COLOR UR: YELLOW
CREAT SERPL-MCNC: 0.9 MG/DL (ref 0.6–1.3)
ERYTHROCYTE [DISTWIDTH] IN BLOOD BY AUTOMATED COUNT: 12.1 % (ref 11.6–15.1)
GFR SERPL CREATININE-BSD FRML MDRD: 93 ML/MIN/1.73SQ M
GLUCOSE P FAST SERPL-MCNC: 98 MG/DL (ref 65–99)
GLUCOSE UR STRIP-MCNC: NEGATIVE MG/DL
HCT VFR BLD AUTO: 45.5 % (ref 36.5–49.3)
HDLC SERPL-MCNC: 57 MG/DL
HGB BLD-MCNC: 15.7 G/DL (ref 12–17)
HGB UR QL STRIP.AUTO: NEGATIVE
KETONES UR STRIP-MCNC: NEGATIVE MG/DL
LDLC SERPL CALC-MCNC: 99 MG/DL (ref 0–100)
LEUKOCYTE ESTERASE UR QL STRIP: NEGATIVE
MCH RBC QN AUTO: 31.7 PG (ref 26.8–34.3)
MCHC RBC AUTO-ENTMCNC: 34.5 G/DL (ref 31.4–37.4)
MCV RBC AUTO: 92 FL (ref 82–98)
NITRITE UR QL STRIP: NEGATIVE
NONHDLC SERPL-MCNC: 127 MG/DL
PH UR STRIP.AUTO: 7 [PH]
PLATELET # BLD AUTO: 224 THOUSANDS/UL (ref 149–390)
PMV BLD AUTO: 10.5 FL (ref 8.9–12.7)
POTASSIUM SERPL-SCNC: 4.2 MMOL/L (ref 3.5–5.3)
PROT SERPL-MCNC: 7.6 G/DL (ref 6.4–8.2)
PROT UR STRIP-MCNC: NEGATIVE MG/DL
PSA SERPL-MCNC: 0.6 NG/ML (ref 0–4)
RBC # BLD AUTO: 4.96 MILLION/UL (ref 3.88–5.62)
SODIUM SERPL-SCNC: 142 MMOL/L (ref 136–145)
SP GR UR STRIP.AUTO: 1.02 (ref 1–1.03)
TRIGL SERPL-MCNC: 141 MG/DL
TSH SERPL DL<=0.05 MIU/L-ACNC: 1.86 UIU/ML (ref 0.36–3.74)
UROBILINOGEN UR QL STRIP.AUTO: 0.2 E.U./DL
WBC # BLD AUTO: 8.13 THOUSAND/UL (ref 4.31–10.16)

## 2021-01-28 PROCEDURE — 3725F SCREEN DEPRESSION PERFORMED: CPT | Performed by: FAMILY MEDICINE

## 2021-01-28 PROCEDURE — 80061 LIPID PANEL: CPT | Performed by: FAMILY MEDICINE

## 2021-01-28 PROCEDURE — 84443 ASSAY THYROID STIM HORMONE: CPT | Performed by: FAMILY MEDICINE

## 2021-01-28 PROCEDURE — 36415 COLL VENOUS BLD VENIPUNCTURE: CPT | Performed by: FAMILY MEDICINE

## 2021-01-28 PROCEDURE — 80053 COMPREHEN METABOLIC PANEL: CPT | Performed by: FAMILY MEDICINE

## 2021-01-28 PROCEDURE — 82306 VITAMIN D 25 HYDROXY: CPT | Performed by: FAMILY MEDICINE

## 2021-01-28 PROCEDURE — 85027 COMPLETE CBC AUTOMATED: CPT | Performed by: FAMILY MEDICINE

## 2021-01-28 PROCEDURE — 1036F TOBACCO NON-USER: CPT | Performed by: FAMILY MEDICINE

## 2021-01-28 PROCEDURE — 99396 PREV VISIT EST AGE 40-64: CPT | Performed by: FAMILY MEDICINE

## 2021-01-28 PROCEDURE — G0103 PSA SCREENING: HCPCS | Performed by: FAMILY MEDICINE

## 2021-01-28 PROCEDURE — 81003 URINALYSIS AUTO W/O SCOPE: CPT | Performed by: FAMILY MEDICINE

## 2021-01-28 PROCEDURE — 3008F BODY MASS INDEX DOCD: CPT | Performed by: FAMILY MEDICINE

## 2021-01-28 RX ORDER — OMEPRAZOLE 40 MG/1
40 CAPSULE, DELAYED RELEASE ORAL DAILY
COMMUNITY

## 2021-01-28 NOTE — PROGRESS NOTES
Assessment/Plan:    Fasting labs drawn as below  Patient to continue present treatment  Patient instructed to follow a low-fat, low-cholesterol and low-carbohydrate diet and get regular aerobic exercise walking 150 minutes per week  Weight loss encouraged  Follow up with specialist as scheduled and return the office in 1 year  Diagnoses and all orders for this visit:    Annual physical exam    Hyperlipidemia, unspecified hyperlipidemia type  -     CBC and Platelet  -     Comprehensive metabolic panel  -     Lipid panel  -     TSH, 3rd generation with Free T4 reflex  -     UA w Reflex to Microscopic w Reflex to Culture - Clinic Collect    GERD without esophagitis    Class 1 obesity due to excess calories with serious comorbidity and body mass index (BMI) of 32 0 to 32 9 in adult  -     TSH, 3rd generation with Free T4 reflex    Vitamin D deficiency  -     Vitamin D 25 hydroxy    Screening PSA (prostate specific antigen)  -     PSA, Total Screen    Other orders  -     omeprazole (PriLOSEC) 40 MG capsule; Take 40 mg by mouth daily          Subjective:      Patient ID: Libra Nevarez is a 62 y o  male  Patient is here for annual physical exam and follow-up of chronic conditions  Patient requests fasting labs  Patient received flu vaccine on 11/21/2020  Patient has been feeling well overall  No regular exercise program and patient admits to being less active during the winter  Patient has been working from home since March of 2020  Patient is status post colonoscopy and EGD on 01/21/2021 with Dr Damaris Dutton and [facility] has a follow-up appointment on 02/05/2021  Patient follows with Dermatology yearly  Hyperlipidemia  This is a chronic problem  The problem is controlled  Exacerbating diseases include obesity  He has no history of diabetes or hypothyroidism  Pertinent negatives include no chest pain, focal sensory loss, focal weakness, leg pain, myalgias or shortness of breath   Current antihyperlipidemic treatment includes statins  The current treatment provides significant improvement of lipids  Compliance problems include adherence to exercise  Risk factors for coronary artery disease include dyslipidemia, male sex and family history  Heartburn  He complains of belching and heartburn  He reports no abdominal pain, no chest pain, no choking, no coughing, no dysphagia, no early satiety, no globus sensation, no hoarse voice, no nausea or no wheezing  The problem occurs rarely  The problem has been gradually improving  The heartburn does not wake him from sleep  The heartburn does not limit his activity  The symptoms are aggravated by certain foods and lying down  Pertinent negatives include no anemia, fatigue, melena, muscle weakness, orthopnea or weight loss  He has tried a PPI for the symptoms  The treatment provided significant relief  Past procedures include an EGD  The following portions of the patient's history were reviewed and updated as appropriate: allergies, current medications, past family history, past medical history, past social history, past surgical history and problem list     Review of Systems   Constitutional: Negative for activity change, appetite change, chills, diaphoresis, fatigue, fever, unexpected weight change and weight loss  HENT: Negative  Negative for hoarse voice  Eyes: Negative  Respiratory: Negative for cough, choking, chest tightness, shortness of breath and wheezing  Cardiovascular: Negative for chest pain, palpitations and leg swelling  Gastrointestinal: Positive for heartburn  Negative for abdominal pain, blood in stool, constipation, diarrhea, dysphagia, melena, nausea and vomiting  Endocrine: Negative for cold intolerance and heat intolerance  Genitourinary: Negative for difficulty urinating, dysuria, frequency and hematuria     Musculoskeletal: Negative for arthralgias, back pain, gait problem, joint swelling, myalgias, muscle weakness, neck pain and neck stiffness  Skin: Negative  Neurological: Negative for dizziness, focal weakness, syncope, weakness, light-headedness and headaches  Hematological: Negative for adenopathy  Does not bruise/bleed easily  Psychiatric/Behavioral: Negative for decreased concentration, dysphoric mood and sleep disturbance  The patient is not nervous/anxious  Objective:      /70 (BP Location: Left arm, Patient Position: Sitting, Cuff Size: Adult)   Pulse 56   Temp 97 5 °F (36 4 °C)   Resp 16   Ht 5' 7 32" (1 71 m)   Wt 91 5 kg (201 lb 12 8 oz)   SpO2 98%   BMI 31 30 kg/m²          Physical Exam  Constitutional:       General: He is not in acute distress  Appearance: Normal appearance  HENT:      Mouth/Throat:      Mouth: Mucous membranes are moist    Eyes:      General: No scleral icterus  Conjunctiva/sclera: Conjunctivae normal    Neck:      Musculoskeletal: Neck supple  Vascular: No carotid bruit  Cardiovascular:      Rate and Rhythm: Normal rate and regular rhythm  Pulmonary:      Effort: Pulmonary effort is normal       Breath sounds: Normal breath sounds  Abdominal:      Palpations: Abdomen is soft  Tenderness: There is no abdominal tenderness  Musculoskeletal:      Right lower leg: No edema  Left lower leg: No edema  Lymphadenopathy:      Cervical: No cervical adenopathy  Skin:     General: Skin is warm and dry  Neurological:      General: No focal deficit present  Mental Status: He is alert and oriented to person, place, and time  Psychiatric:         Mood and Affect: Mood normal          Behavior: Behavior normal          Thought Content: Thought content normal          Judgment: Judgment normal          BMI Counseling: Body mass index is 31 3 kg/m²   The BMI is above normal  Nutrition recommendations include reducing portion sizes, decreasing overall calorie intake, 3-5 servings of fruits/vegetables daily, reducing fast food intake, consuming healthier snacks, decreasing soda and/or juice intake, moderation in carbohydrate intake and reducing intake of saturated fat and trans fat  Exercise recommendations include moderate aerobic physical activity for 150 minutes/week

## 2021-03-28 DIAGNOSIS — E78.5 HYPERLIPIDEMIA, UNSPECIFIED HYPERLIPIDEMIA TYPE: ICD-10-CM

## 2021-03-28 RX ORDER — ATORVASTATIN CALCIUM 20 MG/1
TABLET, FILM COATED ORAL
Qty: 90 TABLET | Refills: 3 | Status: SHIPPED | OUTPATIENT
Start: 2021-03-28 | End: 2022-03-07

## 2021-04-13 DIAGNOSIS — Z23 ENCOUNTER FOR IMMUNIZATION: ICD-10-CM

## 2021-04-20 ENCOUNTER — IMMUNIZATIONS (OUTPATIENT)
Dept: FAMILY MEDICINE CLINIC | Facility: HOSPITAL | Age: 61
End: 2021-04-20

## 2021-04-20 DIAGNOSIS — Z23 ENCOUNTER FOR IMMUNIZATION: Primary | ICD-10-CM

## 2021-04-20 PROCEDURE — 91301 SARS-COV-2 / COVID-19 MRNA VACCINE (MODERNA) 100 MCG: CPT

## 2021-04-20 PROCEDURE — 0011A SARS-COV-2 / COVID-19 MRNA VACCINE (MODERNA) 100 MCG: CPT

## 2021-04-27 ENCOUNTER — TELEPHONE (OUTPATIENT)
Dept: UROLOGY | Facility: AMBULATORY SURGERY CENTER | Age: 61
End: 2021-04-27

## 2021-04-27 ENCOUNTER — OFFICE VISIT (OUTPATIENT)
Dept: FAMILY MEDICINE CLINIC | Facility: CLINIC | Age: 61
End: 2021-04-27
Payer: COMMERCIAL

## 2021-04-27 VITALS
TEMPERATURE: 98.2 F | OXYGEN SATURATION: 95 % | HEART RATE: 60 BPM | HEIGHT: 67 IN | WEIGHT: 204 LBS | BODY MASS INDEX: 32.02 KG/M2 | SYSTOLIC BLOOD PRESSURE: 140 MMHG | RESPIRATION RATE: 16 BRPM | DIASTOLIC BLOOD PRESSURE: 80 MMHG

## 2021-04-27 DIAGNOSIS — R31.9 HEMATURIA, UNSPECIFIED TYPE: Primary | ICD-10-CM

## 2021-04-27 DIAGNOSIS — R39.9 URINARY SYMPTOM OR SIGN: ICD-10-CM

## 2021-04-27 LAB
SL AMB  POCT GLUCOSE, UA: NORMAL
SL AMB LEUKOCYTE ESTERASE,UA: ABNORMAL
SL AMB POCT BILIRUBIN,UA: ABNORMAL
SL AMB POCT BLOOD,UA: ABNORMAL
SL AMB POCT CLARITY,UA: CLEAR
SL AMB POCT COLOR,UA: YELLOW
SL AMB POCT KETONES,UA: ABNORMAL
SL AMB POCT NITRITE,UA: ABNORMAL
SL AMB POCT PH,UA: 6
SL AMB POCT SPECIFIC GRAVITY,UA: 1
SL AMB POCT URINE PROTEIN: ABNORMAL
SL AMB POCT UROBILINOGEN: NORMAL

## 2021-04-27 PROCEDURE — 1036F TOBACCO NON-USER: CPT | Performed by: FAMILY MEDICINE

## 2021-04-27 PROCEDURE — 99214 OFFICE O/P EST MOD 30 MIN: CPT | Performed by: FAMILY MEDICINE

## 2021-04-27 PROCEDURE — 3008F BODY MASS INDEX DOCD: CPT | Performed by: FAMILY MEDICINE

## 2021-04-27 PROCEDURE — 81002 URINALYSIS NONAUTO W/O SCOPE: CPT | Performed by: FAMILY MEDICINE

## 2021-04-27 PROCEDURE — 87086 URINE CULTURE/COLONY COUNT: CPT | Performed by: FAMILY MEDICINE

## 2021-04-27 RX ORDER — CIPROFLOXACIN 500 MG/1
500 TABLET, FILM COATED ORAL EVERY 12 HOURS SCHEDULED
Qty: 10 TABLET | Refills: 0 | Status: SHIPPED | OUTPATIENT
Start: 2021-04-27 | End: 2021-05-02

## 2021-04-27 NOTE — TELEPHONE ENCOUNTER
Reason for appointment/Complaint/Diagnosis :Hematuria from PCP- Dr Cam Gottron :[facility]    History of Cancer?    NO                  If yes, what kind? Previous urologist?   Dr Lorenzo Aguirre [facility]               Records requested/where? Epic/  scheduled  4/30    Outside testing/where? Location Preference for office visit?  [place]    [phone]

## 2021-04-27 NOTE — PROGRESS NOTES
Assessment/Plan:    UA dip reveals blood and trace protein  Urine sent for culture  Will heed results  Patient be scheduled for ultrasound of the kidneys and bladder  Patient was started on Cipro 500 mg 1 b i d  for 5 days and encouraged to increase fluids and rest   Patient is being referred to [facility] for further evaluation and treatment  Return the office in 1 week or call sooner nicol [place] Diagnoses and all orders for this visit:    Hematuria, unspecified type  -     Urine culture  -     US kidney and bladder; Future  -     ciprofloxacin (CIPRO) 500 mg tablet; Take 1 tablet (500 mg total) by mouth every 12 (twelve) hours for 5 days  -     Ambulatory referral to Urology; Future    Urinary symptom or sign  -     POCT urine dip  -     Urine culture  -     US kidney and bladder; Future  -     ciprofloxacin (CIPRO) 500 mg tablet; Take 1 tablet (500 mg total) by mouth every 12 (twelve) hours for 5 days  -     Ambulatory referral to Urology; Future          Subjective:      Patient ID: Joshua Solitario is a 62 y o  male  Patient noticed red to orange colored urine 4 days ago and slight discomfort with urination  He denies any pain or burning with urination  Patient denies increased frequency, urgency or hesitancy  Patient denies fever, chills or sweats  He denies abdominal pain or flank pain  Patient denies history of kidney stones although admits to history of cysts in his kidney from abdominal ultrasound 5 years ago  Patient is a nonsmoker  He has treated this with increased fluids with some improvement  Difficulty Urinating   This is a new problem  The current episode started in the past 7 days  The problem has been unchanged  The quality of the pain is described as aching  The patient is experiencing no pain  There has been no fever  Associated symptoms include hematuria  Pertinent negatives include no chills, flank pain, frequency, hesitancy, nausea, sweats, urgency or vomiting  He has tried increased fluids for the symptoms  The treatment provided mild relief  There is no history of kidney stones or recurrent UTIs  The following portions of the patient's history were reviewed and updated as appropriate: allergies, current medications, past family history, past medical history, past social history, past surgical history and problem list     Review of Systems   Constitutional: Negative for chills  Gastrointestinal: Negative for nausea and vomiting  Genitourinary: Positive for dysuria and hematuria  Negative for flank pain, frequency, hesitancy and urgency  Objective:      /80 (BP Location: Left arm, Patient Position: Sitting, Cuff Size: Large)   Pulse 60   Temp 98 2 °F (36 8 °C) (Tympanic)   Resp 16   Ht 5' 7" (1 702 m)   Wt 92 5 kg (204 lb)   SpO2 95%   BMI 31 95 kg/m²          Physical Exam  Constitutional:       General: He is not in acute distress  Appearance: Normal appearance  He is not ill-appearing, toxic-appearing or diaphoretic  HENT:      Head: Normocephalic  Mouth/Throat:      Mouth: Mucous membranes are moist    Eyes:      General: No scleral icterus  Conjunctiva/sclera: Conjunctivae normal    Neck:      Musculoskeletal: Neck supple  Cardiovascular:      Rate and Rhythm: Normal rate and regular rhythm  Pulmonary:      Effort: Pulmonary effort is normal       Breath sounds: Normal breath sounds  Abdominal:      Palpations: Abdomen is soft  Tenderness: There is no abdominal tenderness  There is no right CVA tenderness or left CVA tenderness  Musculoskeletal:      Right lower leg: No edema  Left lower leg: No edema  Lymphadenopathy:      Cervical: No cervical adenopathy  Skin:     General: Skin is warm and dry  Neurological:      Mental Status: He is alert and oriented to person, place, and time     Psychiatric:         Mood and Affect: Mood normal          Behavior: Behavior normal          Thought Content: Thought content normal          Judgment: Judgment normal

## 2021-04-28 LAB — BACTERIA UR CULT: NORMAL

## 2021-04-28 NOTE — TELEPHONE ENCOUNTER
Contacted and spoke with patient about scheduling an appointment  Patient scheduled with Shannon Degroot on 05/06/2021 at 315 pm at the [facility] office  PALLAVI scheduled 04/30/2021  Office address given to patient

## 2021-04-30 ENCOUNTER — HOSPITAL ENCOUNTER (OUTPATIENT)
Dept: ULTRASOUND IMAGING | Facility: HOSPITAL | Age: 61
Discharge: HOME | End: 2021-04-30
Payer: COMMERCIAL

## 2021-04-30 DIAGNOSIS — R39.9 URINARY SYMPTOM OR SIGN: ICD-10-CM

## 2021-04-30 DIAGNOSIS — R31.9 HEMATURIA, UNSPECIFIED TYPE: ICD-10-CM

## 2021-04-30 PROCEDURE — 76770 US EXAM ABDO BACK WALL COMP: CPT

## 2021-05-04 DIAGNOSIS — N28.1 COMPLEX RENAL CYST: Primary | ICD-10-CM

## 2021-05-06 ENCOUNTER — HOSPITAL ENCOUNTER (OUTPATIENT)
Dept: RADIOLOGY | Facility: IMAGING CENTER | Age: 61
Discharge: HOME | End: 2021-05-06
Payer: COMMERCIAL

## 2021-05-06 ENCOUNTER — OFFICE VISIT (OUTPATIENT)
Dept: UROLOGY | Facility: CLINIC | Age: 61
End: 2021-05-06
Payer: COMMERCIAL

## 2021-05-06 VITALS
DIASTOLIC BLOOD PRESSURE: 80 MMHG | SYSTOLIC BLOOD PRESSURE: 140 MMHG | BODY MASS INDEX: 32.02 KG/M2 | HEIGHT: 67 IN | WEIGHT: 204 LBS

## 2021-05-06 DIAGNOSIS — N28.9 RENAL LESION: ICD-10-CM

## 2021-05-06 DIAGNOSIS — N28.1 COMPLEX RENAL CYST: ICD-10-CM

## 2021-05-06 DIAGNOSIS — R31.0 GROSS HEMATURIA: Primary | ICD-10-CM

## 2021-05-06 PROCEDURE — G1004 CDSM NDSC: HCPCS

## 2021-05-06 PROCEDURE — 3008F BODY MASS INDEX DOCD: CPT | Performed by: PHYSICIAN ASSISTANT

## 2021-05-06 PROCEDURE — 1036F TOBACCO NON-USER: CPT | Performed by: PHYSICIAN ASSISTANT

## 2021-05-06 PROCEDURE — 99204 OFFICE O/P NEW MOD 45 MIN: CPT | Performed by: PHYSICIAN ASSISTANT

## 2021-05-06 PROCEDURE — 74178 CT ABD&PLV WO CNTR FLWD CNTR: CPT

## 2021-05-06 RX ADMIN — IOHEXOL 100 ML: 350 INJECTION, SOLUTION INTRAVENOUS at 09:23

## 2021-05-06 NOTE — PROGRESS NOTES
5/6/2021      Chief Complaint   Patient presents with   [facility] [place] Hematuria         Assessment and Plan    62 y o  male    1  Gross hematuria  2  Bilateral renal cysts, questionable complex cyst/solid lesion LEFT      Follow-up CT renal protocol performed earlier this morning, not yet formally read by Radiology team   Reviewed images in the office today no suspicious solid lesion by our review  Discussed cystoscopy for lower tract evaluation as well to complete the workup  Will be in touch with him regarding the final CT reports  We will update his prostate cancer screening with QUE at time of cystoscopy  PSA is okay 0 6      History of Present Illness  Sanjay Brumfield is a 62 y o  male here for evaluation of New patient referral for painless hematuria  Urinalysis performed last week shows 2+ blood, urine at that time was clear yellow  He had reported gross hematuria at home prior  Urine culture was no growth  He underwent ultrasound of kidneys and bladder last week demonstrating a 1 8cm right upper pole renal cyst with a questionable 1 2 cm mural nodule  On the left-hand side there is a mildly complex cyst in the left upper pole measuring 2 7 cm, and additional smaller simple cysts  Follow-up CT renal protocol performed earlier this morning, not yet formally read by Radiology team   Reviewed images in the office today no suspicious solid lesion by our review  He has no personal or family history of kidney cancers  This is his first episode of hematuria  He has no prior urinary tract symptoms  He is a nonsmoker  Review of Systems   Constitutional: Negative for activity change, appetite change, chills, fever and unexpected weight change  HENT: Negative  Respiratory: Negative  Negative for shortness of breath  Cardiovascular: Negative  Negative for chest pain  Gastrointestinal: Negative for abdominal pain, diarrhea, nausea and vomiting  Endocrine: Negative      Genitourinary: Positive for hematuria  Negative for decreased urine volume, difficulty urinating, dysuria, flank pain, frequency, testicular pain and urgency  Musculoskeletal: Negative for back pain and gait problem  Skin: Negative  Allergic/Immunologic: Negative  Neurological: Negative  Hematological: Negative for adenopathy  Does not bruise/bleed easily  Urinary Incontinence Screening      Most Recent Value   Urinary Incontinence   Urinary Incontinence? No   Incomplete emptying? No   Urinary frequency? No   Urinary urgency? No   Urinary hesitancy? No   Dysuria (painful difficult urination)? No   Nocturia (waking up to use the bathroom)? No   Straining (having to push to go)? No   Weak stream?  No   Intermittent stream?  No   Post void dribbling? No               Vitals  Vitals:    05/06/21 1507   BP: 140/80   Weight: 92 5 kg (204 lb)   Height: 5' 7" (1 702 m)       Physical Exam  Vitals signs and nursing note reviewed  Constitutional:       General: He is not in acute distress  Appearance: He is well-developed  He is not diaphoretic  HENT:      Head: Normocephalic and atraumatic  Pulmonary:      Effort: Pulmonary effort is normal    Abdominal:      Palpations: There is no mass  Tenderness: There is no abdominal tenderness  There is no right CVA tenderness or left CVA tenderness  Genitourinary:     Comments: Deferred until cystoscopy date  Skin:     General: Skin is warm and dry  Neurological:      Mental Status: He is alert and oriented to person, place, and time  Gait: Gait normal    Psychiatric:         Speech: Speech normal          Behavior: Behavior normal            Past History  History reviewed  No pertinent past medical history    Social History     Socioeconomic History    Marital status:      Spouse name: None    Number of children: None    Years of education: None    Highest education level: None   Occupational History    None   Social Needs    Financial resource strain: None    Food insecurity     Worry: None     Inability: None    Transportation needs     Medical: None     Non-medical: None   Tobacco Use    Smoking status: Never Smoker    Smokeless tobacco: Never Used   Substance and Sexual Activity    Alcohol use: Yes     Comment: Social    Drug use: No    Sexual activity: None   Lifestyle    Physical activity     Days per week: None     Minutes per session: None    Stress: None   Relationships    Social connections     Talks on phone: None     Gets together: None     Attends Islam service: None     Active member of club or organization: None     Attends meetings of clubs or organizations: None     Relationship status: None    Intimate partner violence     Fear of current or ex partner: None     Emotionally abused: None     Physically abused: None     Forced sexual activity: None   Other Topics Concern    None   Social History Narrative    None     Social History     Tobacco Use   Smoking Status Never Smoker   Smokeless Tobacco Never Used     Family History   Problem Relation Age of Onset    Coronary artery disease Mother     Colon cancer Father        The following portions of the patient's history were reviewed and updated as appropriate: allergies, current medications, past medical history, past social history, past surgical history and problem list     Results  No results found for this or any previous visit (from the past 1 hour(s)) ]  Lab Results   Component Value Date    PSA 0 6 01/28/2021    PSA 0 8 01/28/2020    PSA 0 9 01/24/2019    PSA 0 8 03/07/2017     Lab Results   Component Value Date    GLUCOSE 110 07/20/2015    CALCIUM 9 2 01/28/2021     07/20/2015    K 4 2 01/28/2021    CO2 27 01/28/2021     (H) 01/28/2021    BUN 15 01/28/2021    CREATININE 0 90 01/28/2021     Lab Results   Component Value Date    WBC 8 13 01/28/2021    HGB 15 7 01/28/2021    HCT 45 5 01/28/2021    MCV 92 01/28/2021     01/28/2021

## 2021-05-12 ENCOUNTER — TELEPHONE (OUTPATIENT)
Dept: UROLOGY | Facility: CLINIC | Age: 61
End: 2021-05-12

## 2021-05-12 NOTE — TELEPHONE ENCOUNTER
Was stent patient seen by Marques Cook and me last week for gross hematuria  Please let him know good news that his CT scan formally read is a simple cyst, no solid mass  He should continue to proceed to cystoscopy as scheduled for the hematuria workup of the lower urinary tracts

## 2021-05-13 NOTE — TELEPHONE ENCOUNTER
Called and spoke with patient  Gave patient results of CT scan  Patient to proceed with cystoscopy as scheduled

## 2021-05-19 ENCOUNTER — IMMUNIZATIONS (OUTPATIENT)
Dept: FAMILY MEDICINE CLINIC | Facility: HOSPITAL | Age: 61
End: 2021-05-19

## 2021-05-19 DIAGNOSIS — Z23 ENCOUNTER FOR IMMUNIZATION: Primary | ICD-10-CM

## 2021-05-19 PROCEDURE — 91301 SARS-COV-2 / COVID-19 MRNA VACCINE (MODERNA) 100 MCG: CPT

## 2021-05-19 PROCEDURE — 0012A SARS-COV-2 / COVID-19 MRNA VACCINE (MODERNA) 100 MCG: CPT

## 2021-07-16 ENCOUNTER — PROCEDURE VISIT (OUTPATIENT)
Dept: UROLOGY | Facility: CLINIC | Age: 61
End: 2021-07-16
Payer: COMMERCIAL

## 2021-07-16 VITALS
SYSTOLIC BLOOD PRESSURE: 142 MMHG | HEIGHT: 67 IN | HEART RATE: 73 BPM | BODY MASS INDEX: 32.02 KG/M2 | DIASTOLIC BLOOD PRESSURE: 88 MMHG | WEIGHT: 204 LBS

## 2021-07-16 DIAGNOSIS — N28.9 RENAL LESION: ICD-10-CM

## 2021-07-16 DIAGNOSIS — R31.0 GROSS HEMATURIA: Primary | ICD-10-CM

## 2021-07-16 LAB
SL AMB  POCT GLUCOSE, UA: NORMAL
SL AMB LEUKOCYTE ESTERASE,UA: NORMAL
SL AMB POCT BILIRUBIN,UA: NORMAL
SL AMB POCT BLOOD,UA: NORMAL
SL AMB POCT CLARITY,UA: CLEAR
SL AMB POCT COLOR,UA: YELLOW
SL AMB POCT KETONES,UA: NORMAL
SL AMB POCT NITRITE,UA: NORMAL
SL AMB POCT PH,UA: 6.5
SL AMB POCT SPECIFIC GRAVITY,UA: 1.01
SL AMB POCT URINE PROTEIN: NORMAL
SL AMB POCT UROBILINOGEN: 0.2

## 2021-07-16 PROCEDURE — 52000 CYSTOURETHROSCOPY: CPT | Performed by: UROLOGY

## 2021-07-16 PROCEDURE — 88112 CYTOPATH CELL ENHANCE TECH: CPT | Performed by: PATHOLOGY

## 2021-07-16 PROCEDURE — 81002 URINALYSIS NONAUTO W/O SCOPE: CPT | Performed by: UROLOGY

## 2021-07-16 PROCEDURE — 3008F BODY MASS INDEX DOCD: CPT | Performed by: PHYSICIAN ASSISTANT

## 2021-07-16 NOTE — PROGRESS NOTES
Cystoscopy     Date/Time 7/16/2021 9:05 AM     Performed by  Mathieu Camp MD     Authorized by Mathieu Camp MD            Rodrigo Larose is a 28-year-old  Male recently seen for gross hematuria  CT renal protocol was performed and reveals simple bilateral renal cysts  He denies any family history of prostate cancer  A recent PSA level is 0 6  He states that his father had a history of colorectal cancer  He has been followed with serial colonoscopy  He has not seen hematuria since his single episode in May 2021  He presents today to undergo cystoscopy  Male cystoscopy procedure note:    Risk and benefits of flexible cystoscopy were discussed  Informed consent was obtained  The patient was placed in the supine position  His genitalia was prepped and draped in a sterile fashion  Viscous lidocaine jelly was instilled into the urethra and flexible cystoscopy was then performed  The urethra, prostatic urethra, and bladder were all thoroughly inspected there was no evidence of mucosal abnormalities or lesions  Both ureteral orifices were visualized with clear efflux of urine  Retroflexion was normal  Overall this was a negative cystoscopy  Digital rectal examination reveals a 30 g prostate which is soft and smooth without nodularity  Impression:  Resolved gross hematuria, negative hematuria workup, routine prostate cancer screening  Plan:  I recommend follow-up in 1 year with a urinalysis and urine cytology  He was instructed to call sooner if he has recurrent gross hematuria  Urine cytology was sent from the office today

## 2021-07-26 ENCOUNTER — OFFICE VISIT (OUTPATIENT)
Dept: URGENT CARE | Age: 61
End: 2021-07-26
Payer: COMMERCIAL

## 2021-07-26 VITALS
DIASTOLIC BLOOD PRESSURE: 74 MMHG | SYSTOLIC BLOOD PRESSURE: 130 MMHG | HEIGHT: 67 IN | WEIGHT: 205 LBS | HEART RATE: 74 BPM | TEMPERATURE: 99 F | OXYGEN SATURATION: 99 % | BODY MASS INDEX: 32.18 KG/M2 | RESPIRATION RATE: 18 BRPM

## 2021-07-26 DIAGNOSIS — M10.9 ACUTE GOUT OF RIGHT FOOT, UNSPECIFIED CAUSE: Primary | ICD-10-CM

## 2021-07-26 PROCEDURE — 99213 OFFICE O/P EST LOW 20 MIN: CPT | Performed by: NURSE PRACTITIONER

## 2021-07-26 RX ORDER — PREDNISONE 10 MG/1
TABLET ORAL
Qty: 21 TABLET | Refills: 0 | Status: SHIPPED | OUTPATIENT
Start: 2021-07-26 | End: 2022-02-01

## 2021-07-26 NOTE — PROGRESS NOTES
NAME: Lawson Rubinstein is a 62 y o  male  : 1960    MRN: [id number]    /74 (BP Location: Right arm, Patient Position: Sitting)   Pulse 74   Temp 99 °F (37 2 °C) (Tympanic)   Resp 18   Ht 5' 7" (1 702 m)   Wt 93 kg (205 lb)   SpO2 99%   BMI 32 11 kg/m²     Assessment and Plan   Acute gout of right foot, unspecified cause [M10 9]  1  Acute gout of right foot, unspecified cause  predniSONE 10 mg tablet       Jordon was seen today for foot pain  Diagnoses and all orders for this visit:    Acute gout of right foot, unspecified cause  -     predniSONE 10 mg tablet; Take 6 tablets today and decrease by one tablet daily until gone        Patient Instructions   Patient Instructions   Take meds as directed   F/u with pcp   If symptoms worsen go to the ER        Gout   WHAT YOU NEED TO KNOW:   Gout is a form of arthritis that causes severe joint pain, redness, swelling, and stiffness  Acute gout pain starts suddenly, gets worse quickly, and stops on its own  Acute gout can become chronic and cause permanent damage to the joints  DISCHARGE INSTRUCTIONS:   Return to the emergency department if:   · You have severe pain in one or more of your joints that you cannot tolerate  · You have a fever or redness that spreads beyond the joint area  Call your doctor if:   · You have new symptoms, such as a rash, after you start gout treatment  · Your joint pain and swelling do not go away, even after treatment  · You are not urinating as much or as often as you usually do  · You have trouble taking your gout medicines  · You have questions or concerns about your condition or care  Medicines: You may need any of the following:  · Prescription pain medicine  may be given  Ask your healthcare provider how to take this medicine safely  Some prescription pain medicines contain acetaminophen  Do not take other medicines that contain acetaminophen without talking to your healthcare provider   Too much acetaminophen may cause liver damage  Prescription pain medicine may cause constipation  Ask your healthcare provider how to prevent or treat constipation  · NSAIDs , such as ibuprofen, help decrease swelling, pain, and fever  This medicine is available with or without a doctor's order  NSAIDs can cause stomach bleeding or kidney problems in certain people  If you take blood thinner medicine, always ask your healthcare provider if NSAIDs are safe for you  Always read the medicine label and follow directions  · Gout medicine  decreases joint pain and swelling  It may also be given to prevent new gout attacks  · Steroids  reduce inflammation and can help your joint stiffness and pain during gout attacks  · Uric acid medicine  may be given to reduce the amount of uric acid your body makes  Some medicines may help you pass more uric acid when you urinate  · Take your medicine as directed  Contact your healthcare provider if you think your medicine is not helping or if you have side effects  Tell him or her if you are allergic to any medicine  Keep a list of the medicines, vitamins, and herbs you take  Include the amounts, and when and why you take them  Bring the list or the pill bottles to follow-up visits  Carry your medicine list with you in case of an emergency  Manage your symptoms:   · Rest your painful joint so it can heal   Your healthcare provider may recommend crutches or a walker if the affected joint is in a leg  · Apply ice to your joint  Ice decreases pain and swelling  Use an ice pack, or put crushed ice in a plastic bag  Cover the ice pack or bag with a towel before you apply it to your painful joint  Apply ice for 15 to 20 minutes every hour, or as directed  · Elevate your joint  Elevation helps reduce swelling and pain  Raise your joint above the level of your heart as often as you can   Prop your painful joint on pillows to keep it above your heart comfortably  · Go to physical therapy if directed  A physical therapist can teach you exercises to improve flexibility and range of motion  Help prevent gout attacks:   · Do not eat high-purine foods  These foods include meats, seafood, asparagus, spinach, cauliflower, and some types of beans  Healthcare providers may tell you to eat more low-fat milk products, such as yogurt  Milk products may decrease your risk for gout attacks  Vitamin C and coffee may also help  Your healthcare provider or dietitian can help you create a meal plan  · Drink liquids as directed  Liquids such as water help remove uric acid from your body  Ask how much liquid to drink each day and which liquids are best for you  · Maintain a healthy weight  Weight loss may decrease the amount of uric acid in your body  Ask your healthcare provider how much you should weigh  Ask him to help you create a weight loss plan if you are overweight  · Control your blood sugar level if you have diabetes  Keep your blood sugar level in a normal range  This can help prevent gout attacks  · Limit or do not drink alcohol as directed  Alcohol can trigger a gout attack  Alcohol also increases your risk for dehydration  Ask your healthcare provider if alcohol is safe for you  Follow up with your doctor as directed: You may be referred to a rheumatologist or podiatrist  Write down your questions so you remember to ask them during your visits  © Copyright [employer] 2021 Information is for End User's use only and may not be sold, redistributed or otherwise used for commercial purposes  All illustrations and images included in CareNotes® are the copyrighted property of [employer] M [employer] Inc  or [facility] [employer]une[employer]   The above information is an  only  It is not intended as medical advice for individual conditions or treatments   Talk to your doctor, nurse or pharmacist before following any medical regimen to see if it is safe and effective for you  Proceed to the nearest ER if symptoms worsen, Follow up with your PCP  Continue to social distance, wash your hands, and wear your masks  Please continue to follow the CDC  gov guidelines daily for they are subject to change on COVID-19    Chief Complaint     Chief Complaint   Patient presents with    Foot Pain     SATURDAY MORNING RT FOOT NO KNOWN INJURY OR TRAUMA SWELLING         History of Present Illness     63 yo male here today for right great toe pain that started Saturday morning, denies injury or trauma and has never had this happen to him prior  He cant take NSAIDs for they upset his stomach and has only tried tylenol  Pain with bearing weight and walking  He feels that his great toe is stiff and unable to bend it  The area over the MTP joint is red and warm to touch, no open wounds noted         Review of Systems   Review of Systems   Musculoskeletal: Positive for arthralgias (right great toe) and gait problem (pain with walking)  Skin: Negative for wound          Right MTP joint of the right great toe           Current Medications       Current Outpatient Medications:     aspirin 81 mg chewable tablet, Chew daily, Disp: , Rfl:     atorvastatin (LIPITOR) 20 mg tablet, TAKE 1 TABLET DAILY AS DIRECTED, Disp: 90 tablet, Rfl: 3    Cholecalciferol (VITAMIN D3) 2000 units capsule, Take 3,000 Units by mouth , Disp: , Rfl:     Cyanocobalamin (VITAMIN B-12 CR PO), Take by mouth, Disp: , Rfl:     econazole nitrate 1 % cream, Apply topically 2 (two) times a day as needed  , Disp: , Rfl:     Multiple Vitamins-Minerals (MULTIVITAMIN ADULT) TABS, Take by mouth, Disp: , Rfl:     Omega-3 Fatty Acids (FISH OIL) 1200 MG CAPS, Take by mouth daily, Disp: , Rfl:     omeprazole (PriLOSEC) 40 MG capsule, Take 40 mg by mouth daily, Disp: , Rfl:     predniSONE 10 mg tablet, Take 6 tablets today and decrease by one tablet daily until gone, Disp: 21 tablet, Rfl: 0    Current Allergies Allergies as of 07/26/2021 - Reviewed 07/26/2021   Allergen Reaction Noted    Sulfa antibiotics  10/14/2013              Past Medical History:   Diagnosis Date    GERD (gastroesophageal reflux disease)     Heart disease        Past Surgical History:   Procedure Laterality Date    CYSTOSCOPY  07/16/2021       Family History   Problem Relation Age of Onset    Coronary artery disease Mother     Colon cancer Father          Medications have been verified  The following portions of the patient's history were reviewed and updated as appropriate: allergies, current medications, past family history, past medical history, past social history, past surgical history and problem list     Objective   /74 (BP Location: Right arm, Patient Position: Sitting)   Pulse 74   Temp 99 °F (37 2 °C) (Tympanic)   Resp 18   Ht 5' 7" (1 702 m)   Wt 93 kg (205 lb)   SpO2 99%   BMI 32 11 kg/m²      Physical Exam     Physical Exam  Constitutional:       Appearance: Normal appearance  Musculoskeletal:        Feet:    Skin:         Neurological:      Mental Status: He is alert  Note: Portions of this record may have been created with voice recognition software  Occasional wrong word or "sound a like" substitutions may have occurred due to the inherent limitations of voice recognition software  Please read the chart carefully and recognize, using context, where substitutions have occurred  CAITLIN Crouch

## 2021-07-26 NOTE — PATIENT INSTRUCTIONS
Take meds as directed   F/u with pcp   If symptoms worsen go to the ER        Gout   WHAT YOU NEED TO KNOW:   Gout is a form of arthritis that causes severe joint pain, redness, swelling, and stiffness  Acute gout pain starts suddenly, gets worse quickly, and stops on its own  Acute gout can become chronic and cause permanent damage to the joints  DISCHARGE INSTRUCTIONS:   Return to the emergency department if:   · You have severe pain in one or more of your joints that you cannot tolerate  · You have a fever or redness that spreads beyond the joint area  Call your doctor if:   · You have new symptoms, such as a rash, after you start gout treatment  · Your joint pain and swelling do not go away, even after treatment  · You are not urinating as much or as often as you usually do  · You have trouble taking your gout medicines  · You have questions or concerns about your condition or care  Medicines: You may need any of the following:  · Prescription pain medicine  may be given  Ask your healthcare provider how to take this medicine safely  Some prescription pain medicines contain acetaminophen  Do not take other medicines that contain acetaminophen without talking to your healthcare provider  Too much acetaminophen may cause liver damage  Prescription pain medicine may cause constipation  Ask your healthcare provider how to prevent or treat constipation  · NSAIDs , such as ibuprofen, help decrease swelling, pain, and fever  This medicine is available with or without a doctor's order  NSAIDs can cause stomach bleeding or kidney problems in certain people  If you take blood thinner medicine, always ask your healthcare provider if NSAIDs are safe for you  Always read the medicine label and follow directions  · Gout medicine  decreases joint pain and swelling  It may also be given to prevent new gout attacks       · Steroids  reduce inflammation and can help your joint stiffness and pain during gout attacks  · Uric acid medicine  may be given to reduce the amount of uric acid your body makes  Some medicines may help you pass more uric acid when you urinate  · Take your medicine as directed  Contact your healthcare provider if you think your medicine is not helping or if you have side effects  Tell him or her if you are allergic to any medicine  Keep a list of the medicines, vitamins, and herbs you take  Include the amounts, and when and why you take them  Bring the list or the pill bottles to follow-up visits  Carry your medicine list with you in case of an emergency  Manage your symptoms:   · Rest your painful joint so it can heal   Your healthcare provider may recommend crutches or a walker if the affected joint is in a leg  · Apply ice to your joint  Ice decreases pain and swelling  Use an ice pack, or put crushed ice in a plastic bag  Cover the ice pack or bag with a towel before you apply it to your painful joint  Apply ice for 15 to 20 minutes every hour, or as directed  · Elevate your joint  Elevation helps reduce swelling and pain  Raise your joint above the level of your heart as often as you can  Prop your painful joint on pillows to keep it above your heart comfortably  · Go to physical therapy if directed  A physical therapist can teach you exercises to improve flexibility and range of motion  Help prevent gout attacks:   · Do not eat high-purine foods  These foods include meats, seafood, asparagus, spinach, cauliflower, and some types of beans  Healthcare providers may tell you to eat more low-fat milk products, such as yogurt  Milk products may decrease your risk for gout attacks  Vitamin C and coffee may also help  Your healthcare provider or dietitian can help you create a meal plan  · Drink liquids as directed  Liquids such as water help remove uric acid from your body   Ask how much liquid to drink each day and which liquids are best for you     · Maintain a healthy weight  Weight loss may decrease the amount of uric acid in your body  Ask your healthcare provider how much you should weigh  Ask him to help you create a weight loss plan if you are overweight  · Control your blood sugar level if you have diabetes  Keep your blood sugar level in a normal range  This can help prevent gout attacks  · Limit or do not drink alcohol as directed  Alcohol can trigger a gout attack  Alcohol also increases your risk for dehydration  Ask your healthcare provider if alcohol is safe for you  Follow up with your doctor as directed: You may be referred to a rheumatologist or podiatrist  Write down your questions so you remember to ask them during your visits  © [employer] 2021 Information is for End User's use only and may not be sold, redistributed or otherwise used for commercial purposes  All illustrations and images included in CareNotes® are the copyrighted property of [facility] [employer]  or [facility] [employer] Nunez   The above information is an  only  It is not intended as medical advice for individual conditions or treatments  Talk to your doctor, nurse or pharmacist before following any medical regimen to see if it is safe and effective for you

## 2022-02-01 ENCOUNTER — OFFICE VISIT (OUTPATIENT)
Dept: FAMILY MEDICINE CLINIC | Facility: CLINIC | Age: 62
End: 2022-02-01
Payer: COMMERCIAL

## 2022-02-01 VITALS
HEART RATE: 64 BPM | TEMPERATURE: 97.5 F | BODY MASS INDEX: 30.92 KG/M2 | HEIGHT: 67 IN | DIASTOLIC BLOOD PRESSURE: 86 MMHG | RESPIRATION RATE: 16 BRPM | WEIGHT: 197 LBS | SYSTOLIC BLOOD PRESSURE: 124 MMHG | OXYGEN SATURATION: 96 %

## 2022-02-01 DIAGNOSIS — Z12.5 SCREENING PSA (PROSTATE SPECIFIC ANTIGEN): ICD-10-CM

## 2022-02-01 DIAGNOSIS — Z00.00 ANNUAL PHYSICAL EXAM: Primary | ICD-10-CM

## 2022-02-01 DIAGNOSIS — E78.5 HYPERLIPIDEMIA, UNSPECIFIED HYPERLIPIDEMIA TYPE: ICD-10-CM

## 2022-02-01 DIAGNOSIS — K21.9 GERD WITHOUT ESOPHAGITIS: ICD-10-CM

## 2022-02-01 DIAGNOSIS — E66.09 CLASS 1 OBESITY DUE TO EXCESS CALORIES WITH SERIOUS COMORBIDITY AND BODY MASS INDEX (BMI) OF 32.0 TO 32.9 IN ADULT: ICD-10-CM

## 2022-02-01 DIAGNOSIS — E55.9 VITAMIN D DEFICIENCY: ICD-10-CM

## 2022-02-01 PROCEDURE — 99396 PREV VISIT EST AGE 40-64: CPT | Performed by: FAMILY MEDICINE

## 2022-02-01 PROCEDURE — 1036F TOBACCO NON-USER: CPT | Performed by: FAMILY MEDICINE

## 2022-02-01 PROCEDURE — 3008F BODY MASS INDEX DOCD: CPT | Performed by: FAMILY MEDICINE

## 2022-02-01 NOTE — PROGRESS NOTES
Assessment/Plan:  Patient given lab requisition for fasting labs as below  Patient to continue present treatment  Patient instructed to follow a low-fat, low-salt and a low-carbohydrate diet and get regular aerobic exercise 150 minutes per week  Weight loss encouraged  Return to the office in 1 year  Diagnoses and all orders for this visit:    Annual physical exam  -     CBC and Platelet; Future    Hyperlipidemia, unspecified hyperlipidemia type  -     Comprehensive metabolic panel; Future  -     Lipid panel; Future  -     TSH, 3rd generation with Free T4 reflex; Future  -     UA w Reflex to Microscopic w Reflex to Culture -Lab Collect; Future    GERD without esophagitis    Class 1 obesity due to excess calories with serious comorbidity and body mass index (BMI) of 32 0 to 32 9 in adult    Vitamin D deficiency  -     Vitamin D 25 hydroxy; Future    Screening PSA (prostate specific antigen)  -     PSA, Total Screen; Future          Subjective:      Patient ID: Shruthi Rivera is a 62 y o  male  Patient is here for annual physical exam and follow-up of chronic conditions  Patient has been feeling well overall  No regular exercise program   Patient is up-to-date on colonoscopy and EGD last year  Patient follows with dermatology every 6-12 months  Hyperlipidemia  This is a chronic problem  The problem is controlled  He has no history of diabetes or hypothyroidism  Pertinent negatives include no chest pain, focal sensory loss, focal weakness, leg pain, myalgias or shortness of breath  Current antihyperlipidemic treatment includes statins  The current treatment provides significant improvement of lipids  Compliance problems include adherence to exercise  Risk factors for coronary artery disease include dyslipidemia, family history and male sex  Heartburn  He complains of belching   He reports no abdominal pain, no chest pain, no choking, no coughing, no dysphagia, no early satiety, no globus sensation, no heartburn, no hoarse voice or no nausea  The problem has been resolved  Nothing aggravates the symptoms  Pertinent negatives include no anemia, fatigue, melena, muscle weakness or weight loss  He has tried a PPI for the symptoms  The treatment provided significant relief  Past procedures include an EGD  The following portions of the patient's history were reviewed and updated as appropriate: allergies, current medications, past family history, past medical history, past social history, past surgical history and problem list     Review of Systems   Constitutional: Negative for fatigue and weight loss  HENT: Negative for hoarse voice  Respiratory: Negative for cough, choking and shortness of breath  Cardiovascular: Negative for chest pain  Gastrointestinal: Negative for abdominal pain, dysphagia, heartburn, melena and nausea  Musculoskeletal: Negative for myalgias and muscle weakness  Neurological: Negative for focal weakness  Objective:      /86 (BP Location: Left arm, Patient Position: Sitting, Cuff Size: Standard)   Pulse 64   Temp 97 5 °F (36 4 °C) (Skin)   Resp 16   Ht 5' 7" (1 702 m)   Wt 89 4 kg (197 lb)   SpO2 96%   BMI 30 85 kg/m²          Physical Exam  Constitutional:       General: He is not in acute distress  Appearance: Normal appearance  HENT:      Head: Normocephalic  Mouth/Throat:      Mouth: Mucous membranes are moist    Eyes:      General: No scleral icterus  Conjunctiva/sclera: Conjunctivae normal    Neck:      Vascular: No carotid bruit  Cardiovascular:      Rate and Rhythm: Normal rate and regular rhythm  Pulmonary:      Effort: Pulmonary effort is normal       Breath sounds: Normal breath sounds  Abdominal:      Palpations: Abdomen is soft  Tenderness: There is no abdominal tenderness  Musculoskeletal:      Cervical back: Neck supple  Right lower leg: No edema  Left lower leg: No edema  Lymphadenopathy:      Cervical: No cervical adenopathy  Skin:     General: Skin is warm and dry  Neurological:      General: No focal deficit present  Mental Status: He is alert and oriented to person, place, and time  Psychiatric:         Mood and Affect: Mood normal          Behavior: Behavior normal          Thought Content: Thought content normal          Judgment: Judgment normal          BMI Counseling: Body mass index is 30 85 kg/m²  The BMI is above normal  Nutrition recommendations include reducing portion sizes, decreasing overall calorie intake, 3-5 servings of fruits/vegetables daily, reducing fast food intake, consuming healthier snacks, decreasing soda and/or juice intake, moderation in carbohydrate intake, increasing intake of lean protein, reducing intake of saturated fat and trans fat and reducing intake of cholesterol  Exercise recommendations include moderate aerobic physical activity for 150 minutes/week

## 2022-03-02 ENCOUNTER — APPOINTMENT (OUTPATIENT)
Dept: LAB | Age: 62
End: 2022-03-02
Payer: COMMERCIAL

## 2022-03-02 DIAGNOSIS — E55.9 VITAMIN D DEFICIENCY: ICD-10-CM

## 2022-03-02 DIAGNOSIS — E78.5 HYPERLIPIDEMIA, UNSPECIFIED HYPERLIPIDEMIA TYPE: ICD-10-CM

## 2022-03-02 DIAGNOSIS — Z12.5 SCREENING PSA (PROSTATE SPECIFIC ANTIGEN): ICD-10-CM

## 2022-03-02 DIAGNOSIS — Z00.00 ANNUAL PHYSICAL EXAM: ICD-10-CM

## 2022-03-02 LAB
25(OH)D3 SERPL-MCNC: 47.2 NG/ML (ref 30–100)
ALBUMIN SERPL BCP-MCNC: 4 G/DL (ref 3.5–5)
ALP SERPL-CCNC: 62 U/L (ref 46–116)
ALT SERPL W P-5'-P-CCNC: 35 U/L (ref 12–78)
ANION GAP SERPL CALCULATED.3IONS-SCNC: 3 MMOL/L (ref 4–13)
AST SERPL W P-5'-P-CCNC: 15 U/L (ref 5–45)
BACTERIA UR QL AUTO: ABNORMAL /HPF
BILIRUB SERPL-MCNC: 0.44 MG/DL (ref 0.2–1)
BILIRUB UR QL STRIP: NEGATIVE
BUN SERPL-MCNC: 13 MG/DL (ref 5–25)
CALCIUM SERPL-MCNC: 9.3 MG/DL (ref 8.3–10.1)
CHLORIDE SERPL-SCNC: 112 MMOL/L (ref 100–108)
CHOLEST SERPL-MCNC: 177 MG/DL
CLARITY UR: CLEAR
CO2 SERPL-SCNC: 27 MMOL/L (ref 21–32)
COLOR UR: YELLOW
CREAT SERPL-MCNC: 1 MG/DL (ref 0.6–1.3)
ERYTHROCYTE [DISTWIDTH] IN BLOOD BY AUTOMATED COUNT: 12 % (ref 11.6–15.1)
GFR SERPL CREATININE-BSD FRML MDRD: 81 ML/MIN/1.73SQ M
GLUCOSE P FAST SERPL-MCNC: 105 MG/DL (ref 65–99)
GLUCOSE UR STRIP-MCNC: NEGATIVE MG/DL
HCT VFR BLD AUTO: 45.9 % (ref 36.5–49.3)
HDLC SERPL-MCNC: 61 MG/DL
HGB BLD-MCNC: 15.6 G/DL (ref 12–17)
HGB UR QL STRIP.AUTO: ABNORMAL
KETONES UR STRIP-MCNC: NEGATIVE MG/DL
LDLC SERPL CALC-MCNC: 99 MG/DL (ref 0–100)
LEUKOCYTE ESTERASE UR QL STRIP: NEGATIVE
MCH RBC QN AUTO: 32.1 PG (ref 26.8–34.3)
MCHC RBC AUTO-ENTMCNC: 34 G/DL (ref 31.4–37.4)
MCV RBC AUTO: 94 FL (ref 82–98)
MUCOUS THREADS UR QL AUTO: ABNORMAL
NITRITE UR QL STRIP: NEGATIVE
NON-SQ EPI CELLS URNS QL MICRO: ABNORMAL /HPF
NONHDLC SERPL-MCNC: 116 MG/DL
PH UR STRIP.AUTO: 6 [PH]
PLATELET # BLD AUTO: 239 THOUSANDS/UL (ref 149–390)
PMV BLD AUTO: 10.5 FL (ref 8.9–12.7)
POTASSIUM SERPL-SCNC: 4.3 MMOL/L (ref 3.5–5.3)
PROT SERPL-MCNC: 7.8 G/DL (ref 6.4–8.2)
PROT UR STRIP-MCNC: ABNORMAL MG/DL
PSA SERPL-MCNC: 0.7 NG/ML (ref 0–4)
RBC # BLD AUTO: 4.86 MILLION/UL (ref 3.88–5.62)
RBC #/AREA URNS AUTO: ABNORMAL /HPF
SODIUM SERPL-SCNC: 142 MMOL/L (ref 136–145)
SP GR UR STRIP.AUTO: 1.02 (ref 1–1.03)
TRIGL SERPL-MCNC: 85 MG/DL
TSH SERPL DL<=0.05 MIU/L-ACNC: 1.44 UIU/ML (ref 0.36–3.74)
UROBILINOGEN UR STRIP-ACNC: <2 MG/DL
WBC # BLD AUTO: 6.63 THOUSAND/UL (ref 4.31–10.16)
WBC #/AREA URNS AUTO: ABNORMAL /HPF

## 2022-03-02 PROCEDURE — 82306 VITAMIN D 25 HYDROXY: CPT

## 2022-03-02 PROCEDURE — G0103 PSA SCREENING: HCPCS

## 2022-03-02 PROCEDURE — 80061 LIPID PANEL: CPT

## 2022-03-02 PROCEDURE — 80053 COMPREHEN METABOLIC PANEL: CPT

## 2022-03-02 PROCEDURE — 84443 ASSAY THYROID STIM HORMONE: CPT

## 2022-03-02 PROCEDURE — 85027 COMPLETE CBC AUTOMATED: CPT

## 2022-03-02 PROCEDURE — 36415 COLL VENOUS BLD VENIPUNCTURE: CPT

## 2022-03-02 PROCEDURE — 81001 URINALYSIS AUTO W/SCOPE: CPT

## 2022-03-07 DIAGNOSIS — E78.5 HYPERLIPIDEMIA, UNSPECIFIED HYPERLIPIDEMIA TYPE: ICD-10-CM

## 2022-03-07 RX ORDER — ATORVASTATIN CALCIUM 20 MG/1
TABLET, FILM COATED ORAL
Qty: 90 TABLET | Refills: 3 | Status: SHIPPED | OUTPATIENT
Start: 2022-03-07

## 2023-01-25 ENCOUNTER — RA CDI HCC (OUTPATIENT)
Dept: OTHER | Facility: HOSPITAL | Age: 63
End: 2023-01-25

## 2023-01-25 NOTE — PROGRESS NOTES
[facility] 76  coding opportunities       Chart reviewed, no opportunity found: CHART REVIEWED, NO OPPORTUNITY FOUND      This is a reminder to address ALL HCC (risk adjustment) codes as found on active problem list for 2023 as patient scores reset to zero LAURA    Patients Insurance        Commercial Insurance: [address] [place] [address]

## 2023-02-07 ENCOUNTER — OFFICE VISIT (OUTPATIENT)
Dept: FAMILY MEDICINE CLINIC | Facility: CLINIC | Age: 63
End: 2023-02-07

## 2023-02-07 VITALS
RESPIRATION RATE: 16 BRPM | TEMPERATURE: 97.5 F | BODY MASS INDEX: 31.27 KG/M2 | HEART RATE: 64 BPM | WEIGHT: 199.2 LBS | SYSTOLIC BLOOD PRESSURE: 120 MMHG | HEIGHT: 67 IN | OXYGEN SATURATION: 96 % | DIASTOLIC BLOOD PRESSURE: 68 MMHG

## 2023-02-07 DIAGNOSIS — K21.9 GERD WITHOUT ESOPHAGITIS: ICD-10-CM

## 2023-02-07 DIAGNOSIS — E78.5 HYPERLIPIDEMIA, UNSPECIFIED HYPERLIPIDEMIA TYPE: Primary | ICD-10-CM

## 2023-02-07 DIAGNOSIS — Z12.5 SCREENING PSA (PROSTATE SPECIFIC ANTIGEN): ICD-10-CM

## 2023-02-07 DIAGNOSIS — E55.9 VITAMIN D DEFICIENCY: ICD-10-CM

## 2023-02-07 DIAGNOSIS — E66.09 CLASS 1 OBESITY DUE TO EXCESS CALORIES WITH SERIOUS COMORBIDITY AND BODY MASS INDEX (BMI) OF 32.0 TO 32.9 IN ADULT: ICD-10-CM

## 2023-02-07 NOTE — PROGRESS NOTES
Name: Nelli Shelton      : 1960      MRN: [id number]  Encounter Provider: Roseanne Villa DO  Encounter Date: 2023   Encounter department: [address] [place] [address]   Patient given lab requisition for fasting labs as below  Patient to continue present treatment  Instructed to follow a low-fat, low-salt and a low carbohydrate diet and get regular aerobic exercise walking 150 minutes/week  Weight loss encouraged  Return to the office in 1 year  1  Hyperlipidemia, unspecified hyperlipidemia type  -     CBC and Platelet; Future  -     Comprehensive metabolic panel; Future  -     Lipid panel; Future  -     TSH, 3rd generation with Free T4 reflex; Future  -     UA w Reflex to Microscopic w Reflex to Culture -Lab Collect; Future; Expected date: 2023    2  GERD without esophagitis    3  Class 1 obesity due to excess calories with serious comorbidity and body mass index (BMI) of 32 0 to 32 9 in adult    4  Vitamin D deficiency  -     Vitamin D 25 hydroxy; Future    5  Screening PSA (prostate specific antigen)  -     PSA, Total Screen; Future           Subjective      Patient is here for annual follow-up of chronic conditions and due for fasting labs  Patient is feeling well overall and plans to retire later this year  No regular exercise program although patient does go for occasional walks  He admits to being less active during the winter  Patient is up-to-date on colonoscopy  Hyperlipidemia  This is a chronic problem  The problem is controlled  Exacerbating diseases include obesity  He has no history of diabetes or hypothyroidism  Pertinent negatives include no chest pain, focal sensory loss, focal weakness, leg pain, myalgias or shortness of breath  Current antihyperlipidemic treatment includes statins  The current treatment provides significant improvement of lipids  There are no compliance problems    Risk factors for coronary artery disease include dyslipidemia, male sex and family history  Heartburn  He complains of belching  He reports no abdominal pain, no chest pain, no choking, no coughing, no dysphagia, no early satiety, no globus sensation, no heartburn, no hoarse voice or no nausea  The problem has been resolved  The symptoms are aggravated by certain foods  Pertinent negatives include no anemia, fatigue, melena, muscle weakness, orthopnea or weight loss  He has tried a PPI for the symptoms  The treatment provided significant relief  Past procedures include an EGD  Review of Systems   Constitutional: Negative for fatigue and weight loss  HENT: Negative for hoarse voice  Respiratory: Negative for cough, choking and shortness of breath  Cardiovascular: Negative for chest pain  Gastrointestinal: Negative for abdominal pain, dysphagia, heartburn, melena and nausea  Musculoskeletal: Negative for myalgias and muscle weakness  Neurological: Negative for focal weakness  Current Outpatient Medications on File Prior to Visit   Medication Sig   • aspirin 81 mg chewable tablet Chew daily   • atorvastatin (LIPITOR) 20 mg tablet TAKE 1 TABLET DAILY AS DIRECTED   • Cholecalciferol (VITAMIN D3) 2000 units capsule Take 3,000 Units by mouth    • Cyanocobalamin (VITAMIN B-12 CR PO) Take by mouth   • econazole nitrate 1 % cream Apply topically 2 (two) times a day as needed     • Multiple Vitamins-Minerals (MULTIVITAMIN ADULT) TABS Take by mouth   • Omega-3 Fatty Acids (FISH OIL) 1200 MG CAPS Take by mouth daily   • omeprazole (PriLOSEC) 40 MG capsule Take 40 mg by mouth daily       Objective     /68 (BP Location: Left arm, Patient Position: Sitting, Cuff Size: Standard)   Pulse 64   Temp 97 5 °F (36 4 °C) (Temporal)   Resp 16   Ht 5' 7" (1 702 m)   Wt 90 4 kg (199 lb 3 2 oz)   SpO2 96%   BMI 31 20 kg/m²     Physical Exam  Constitutional:       General: He is not in acute distress  Appearance: Normal appearance  He is obese  HENT:      Head: Normocephalic  Mouth/Throat:      Mouth: Mucous membranes are moist    Eyes:      General: No scleral icterus  Conjunctiva/sclera: Conjunctivae normal    Neck:      Vascular: No carotid bruit  Cardiovascular:      Rate and Rhythm: Normal rate and regular rhythm  Pulmonary:      Effort: Pulmonary effort is normal       Breath sounds: Normal breath sounds  Abdominal:      Palpations: Abdomen is soft  Tenderness: There is no abdominal tenderness  Musculoskeletal:      Cervical back: Neck supple  Right lower leg: No edema  Left lower leg: No edema  Lymphadenopathy:      Cervical: No cervical adenopathy  Skin:     General: Skin is warm and dry  Neurological:      General: No focal deficit present  Mental Status: He is alert and oriented to person, place, and time  Psychiatric:         Mood and Affect: Mood normal          Behavior: Behavior normal          Thought Content: Thought content normal          Judgment: Judgment normal        Jamila Bonner DO  BMI Counseling: Body mass index is 31 2 kg/m²  The BMI is above normal  Nutrition recommendations include reducing portion sizes, decreasing overall calorie intake, 3-5 servings of fruits/vegetables daily, reducing fast food intake, consuming healthier snacks, decreasing soda and/or juice intake, moderation in carbohydrate intake, increasing intake of lean protein, reducing intake of saturated fat and trans fat and reducing intake of cholesterol  Exercise recommendations include moderate aerobic physical activity for 150 minutes/week

## 2023-02-28 DIAGNOSIS — E78.5 HYPERLIPIDEMIA, UNSPECIFIED HYPERLIPIDEMIA TYPE: ICD-10-CM

## 2023-02-28 RX ORDER — ATORVASTATIN CALCIUM 20 MG/1
TABLET, FILM COATED ORAL
Qty: 90 TABLET | Refills: 3 | Status: SHIPPED | OUTPATIENT
Start: 2023-02-28

## 2023-05-12 ENCOUNTER — APPOINTMENT (EMERGENCY)
Dept: CT IMAGING | Facility: HOSPITAL | Age: 63
End: 2023-05-12

## 2023-05-12 ENCOUNTER — OFFICE VISIT (OUTPATIENT)
Dept: URGENT CARE | Age: 63
End: 2023-05-12

## 2023-05-12 ENCOUNTER — HOSPITAL ENCOUNTER (OUTPATIENT)
Facility: HOSPITAL | Age: 63
Setting detail: OBSERVATION
Discharge: HOME | End: 2023-05-14
Attending: EMERGENCY MEDICINE | Admitting: SURGERY

## 2023-05-12 VITALS
BODY MASS INDEX: 30.45 KG/M2 | TEMPERATURE: 99.8 F | OXYGEN SATURATION: 97 % | HEIGHT: 67 IN | WEIGHT: 194 LBS | HEART RATE: 74 BPM | RESPIRATION RATE: 18 BRPM

## 2023-05-12 DIAGNOSIS — K80.20 GALLSTONES: ICD-10-CM

## 2023-05-12 DIAGNOSIS — R31.29 OTHER MICROSCOPIC HEMATURIA: ICD-10-CM

## 2023-05-12 DIAGNOSIS — R10.9 ABDOMINAL PAIN: ICD-10-CM

## 2023-05-12 DIAGNOSIS — R10.30 LOWER ABDOMINAL PAIN: Primary | ICD-10-CM

## 2023-05-12 DIAGNOSIS — K57.92 DIVERTICULITIS: Primary | ICD-10-CM

## 2023-05-12 LAB
ANION GAP SERPL CALCULATED.3IONS-SCNC: 8 MMOL/L (ref 4–13)
BACTERIA UR QL AUTO: ABNORMAL /HPF
BASOPHILS # BLD AUTO: 0.05 THOUSANDS/ÂΜL (ref 0–0.1)
BASOPHILS NFR BLD AUTO: 0 % (ref 0–1)
BILIRUB UR QL STRIP: NEGATIVE
BUN SERPL-MCNC: 11 MG/DL (ref 5–25)
CALCIUM SERPL-MCNC: 9.8 MG/DL (ref 8.4–10.2)
CHLORIDE SERPL-SCNC: 103 MMOL/L (ref 96–108)
CLARITY UR: CLEAR
CO2 SERPL-SCNC: 25 MMOL/L (ref 21–32)
COLOR UR: YELLOW
CREAT SERPL-MCNC: 0.85 MG/DL (ref 0.6–1.3)
EOSINOPHIL # BLD AUTO: 0.01 THOUSAND/ÂΜL (ref 0–0.61)
EOSINOPHIL NFR BLD AUTO: 0 % (ref 0–6)
ERYTHROCYTE [DISTWIDTH] IN BLOOD BY AUTOMATED COUNT: 11.9 % (ref 11.6–15.1)
GFR SERPL CREATININE-BSD FRML MDRD: 94 ML/MIN/1.73SQ M
GLUCOSE SERPL-MCNC: 102 MG/DL (ref 65–140)
GLUCOSE UR STRIP-MCNC: NEGATIVE MG/DL
HCT VFR BLD AUTO: 44.4 % (ref 36.5–49.3)
HGB BLD-MCNC: 15.6 G/DL (ref 12–17)
HGB UR QL STRIP.AUTO: ABNORMAL
IMM GRANULOCYTES # BLD AUTO: 0.06 THOUSAND/UL (ref 0–0.2)
IMM GRANULOCYTES NFR BLD AUTO: 0 % (ref 0–2)
KETONES UR STRIP-MCNC: ABNORMAL MG/DL
LACTATE SERPL-SCNC: 0.8 MMOL/L (ref 0.5–2)
LEUKOCYTE ESTERASE UR QL STRIP: NEGATIVE
LYMPHOCYTES # BLD AUTO: 2.6 THOUSANDS/ÂΜL (ref 0.6–4.47)
LYMPHOCYTES NFR BLD AUTO: 15 % (ref 14–44)
MCH RBC QN AUTO: 32 PG (ref 26.8–34.3)
MCHC RBC AUTO-ENTMCNC: 35.1 G/DL (ref 31.4–37.4)
MCV RBC AUTO: 91 FL (ref 82–98)
MONOCYTES # BLD AUTO: 1.77 THOUSAND/ÂΜL (ref 0.17–1.22)
MONOCYTES NFR BLD AUTO: 10 % (ref 4–12)
MUCOUS THREADS UR QL AUTO: ABNORMAL
NEUTROPHILS # BLD AUTO: 12.76 THOUSANDS/ÂΜL (ref 1.85–7.62)
NEUTS SEG NFR BLD AUTO: 75 % (ref 43–75)
NITRITE UR QL STRIP: NEGATIVE
NON-SQ EPI CELLS URNS QL MICRO: ABNORMAL /HPF
NRBC BLD AUTO-RTO: 0 /100 WBCS
PH UR STRIP.AUTO: 6 [PH]
PLATELET # BLD AUTO: 198 THOUSANDS/UL (ref 149–390)
PMV BLD AUTO: 10.1 FL (ref 8.9–12.7)
POTASSIUM SERPL-SCNC: 3.4 MMOL/L (ref 3.5–5.3)
PROT UR STRIP-MCNC: ABNORMAL MG/DL
RBC # BLD AUTO: 4.87 MILLION/UL (ref 3.88–5.62)
RBC #/AREA URNS AUTO: ABNORMAL /HPF
SL AMB  POCT GLUCOSE, UA: NEGATIVE
SL AMB LEUKOCYTE ESTERASE,UA: NEGATIVE
SL AMB POCT BILIRUBIN,UA: NEGATIVE
SL AMB POCT BLOOD,UA: ABNORMAL
SL AMB POCT CLARITY,UA: CLEAR
SL AMB POCT COLOR,UA: ABNORMAL
SL AMB POCT KETONES,UA: 40
SL AMB POCT NITRITE,UA: NEGATIVE
SL AMB POCT PH,UA: 6.5
SL AMB POCT SPECIFIC GRAVITY,UA: 1.01
SL AMB POCT URINE PROTEIN: ABNORMAL
SL AMB POCT UROBILINOGEN: 0.2
SODIUM SERPL-SCNC: 136 MMOL/L (ref 135–147)
SP GR UR STRIP.AUTO: 1.02 (ref 1–1.03)
UROBILINOGEN UR STRIP-ACNC: <2 MG/DL
WBC # BLD AUTO: 17.25 THOUSAND/UL (ref 4.31–10.16)
WBC #/AREA URNS AUTO: ABNORMAL /HPF

## 2023-05-12 RX ORDER — ACETAMINOPHEN 325 MG/1
975 TABLET ORAL EVERY 8 HOURS SCHEDULED
Status: DISCONTINUED | OUTPATIENT
Start: 2023-05-12 | End: 2023-05-14 | Stop reason: HOSPADM

## 2023-05-12 RX ORDER — OXYCODONE HYDROCHLORIDE 5 MG/1
5 TABLET ORAL EVERY 4 HOURS PRN
Status: DISCONTINUED | OUTPATIENT
Start: 2023-05-12 | End: 2023-05-14 | Stop reason: HOSPADM

## 2023-05-12 RX ORDER — ONDANSETRON 2 MG/ML
4 INJECTION INTRAMUSCULAR; INTRAVENOUS EVERY 6 HOURS PRN
Status: DISCONTINUED | OUTPATIENT
Start: 2023-05-12 | End: 2023-05-14 | Stop reason: HOSPADM

## 2023-05-12 RX ORDER — SODIUM CHLORIDE, SODIUM LACTATE, POTASSIUM CHLORIDE, CALCIUM CHLORIDE 600; 310; 30; 20 MG/100ML; MG/100ML; MG/100ML; MG/100ML
125 INJECTION, SOLUTION INTRAVENOUS CONTINUOUS
Status: DISCONTINUED | OUTPATIENT
Start: 2023-05-12 | End: 2023-05-13

## 2023-05-12 RX ORDER — METRONIDAZOLE 500 MG/1
500 TABLET ORAL ONCE
Status: COMPLETED | OUTPATIENT
Start: 2023-05-12 | End: 2023-05-12

## 2023-05-12 RX ORDER — PANTOPRAZOLE SODIUM 40 MG/1
40 TABLET, DELAYED RELEASE ORAL
Status: DISCONTINUED | OUTPATIENT
Start: 2023-05-13 | End: 2023-05-14 | Stop reason: HOSPADM

## 2023-05-12 RX ORDER — ACETAMINOPHEN 325 MG/1
650 TABLET ORAL EVERY 6 HOURS PRN
Status: DISCONTINUED | OUTPATIENT
Start: 2023-05-12 | End: 2023-05-12

## 2023-05-12 RX ORDER — KETOROLAC TROMETHAMINE 30 MG/ML
30 INJECTION, SOLUTION INTRAMUSCULAR; INTRAVENOUS ONCE
Status: COMPLETED | OUTPATIENT
Start: 2023-05-12 | End: 2023-05-12

## 2023-05-12 RX ORDER — OXYCODONE HYDROCHLORIDE 10 MG/1
10 TABLET ORAL EVERY 4 HOURS PRN
Status: DISCONTINUED | OUTPATIENT
Start: 2023-05-12 | End: 2023-05-14 | Stop reason: HOSPADM

## 2023-05-12 RX ORDER — HYDROMORPHONE HCL/PF 1 MG/ML
0.5 SYRINGE (ML) INJECTION
Status: CANCELLED | OUTPATIENT
Start: 2023-05-12

## 2023-05-12 RX ORDER — SIMETHICONE 80 MG
80 TABLET,CHEWABLE ORAL EVERY 6 HOURS PRN
COMMUNITY

## 2023-05-12 RX ORDER — HEPARIN SODIUM 5000 [USP'U]/ML
5000 INJECTION, SOLUTION INTRAVENOUS; SUBCUTANEOUS EVERY 8 HOURS SCHEDULED
Status: DISCONTINUED | OUTPATIENT
Start: 2023-05-12 | End: 2023-05-14 | Stop reason: HOSPADM

## 2023-05-12 RX ORDER — POTASSIUM CHLORIDE 14.9 MG/ML
20 INJECTION INTRAVENOUS ONCE
Status: COMPLETED | OUTPATIENT
Start: 2023-05-12 | End: 2023-05-13

## 2023-05-12 RX ADMIN — IOHEXOL 100 ML: 350 INJECTION, SOLUTION INTRAVENOUS at 19:07

## 2023-05-12 RX ADMIN — KETOROLAC TROMETHAMINE 30 MG: 30 INJECTION, SOLUTION INTRAMUSCULAR; INTRAVENOUS at 17:47

## 2023-05-12 RX ADMIN — SODIUM CHLORIDE, SODIUM LACTATE, POTASSIUM CHLORIDE, AND CALCIUM CHLORIDE 1000 ML: .6; .31; .03; .02 INJECTION, SOLUTION INTRAVENOUS at 17:45

## 2023-05-12 RX ADMIN — POTASSIUM CHLORIDE 20 MEQ: 14.9 INJECTION, SOLUTION INTRAVENOUS at 21:44

## 2023-05-12 RX ADMIN — ACETAMINOPHEN 975 MG: 325 TABLET ORAL at 22:17

## 2023-05-12 RX ADMIN — CEFTRIAXONE SODIUM 1000 MG: 10 INJECTION, POWDER, FOR SOLUTION INTRAVENOUS at 20:40

## 2023-05-12 RX ADMIN — METRONIDAZOLE 500 MG: 500 TABLET ORAL at 20:40

## 2023-05-12 RX ADMIN — SODIUM CHLORIDE, SODIUM LACTATE, POTASSIUM CHLORIDE, AND CALCIUM CHLORIDE 125 ML/HR: .6; .31; .03; .02 INJECTION, SOLUTION INTRAVENOUS at 21:41

## 2023-05-12 RX ADMIN — PIPERACILLIN SODIUM AND TAZOBACTAM SODIUM 3.38 G: 36; 4.5 INJECTION, POWDER, LYOPHILIZED, FOR SOLUTION INTRAVENOUS at 23:57

## 2023-05-12 RX ADMIN — HEPARIN SODIUM 5000 UNITS: 5000 INJECTION INTRAVENOUS; SUBCUTANEOUS at 22:17

## 2023-05-12 NOTE — ED PROVIDER NOTES
History  Chief Complaint   Patient presents with   • Abdominal Pain     Patient c/o generalized lower abdominal pain and and pain with urination  Patient denies N/V/D  Patient is a 81-year-old gentleman coming in today for abdominal pain  Patient went to the urgent center and sent in for further evaluation  Patient states that yesterday his symptoms started with an achiness to the lower abdomen and left lower quadrant  He also complains of some burning discomfort with urination but denies any hematuria  He denies any nausea, vomiting, diarrhea  He has not really eaten in the past 2 days  His last bowel movement was yesterday and normal without any melena or bright red blood per rectum  Surgeries to the abdomen  No recent travel, sick contacts or recent antibiotic use  He states that he had a low-grade temperature today resolved  No trauma to the abdomen        History provided by:  Patient and medical records  Abdominal Pain  Pain location:  Suprapubic, LLQ, RLQ and periumbilical  Pain quality: aching, cramping and fullness    Pain radiates to:  Does not radiate  Pain severity:  Moderate  Onset quality:  Gradual  Timing:  Constant  Progression:  Unchanged  Chronicity:  New  Context: not alcohol use, not awakening from sleep, not diet changes, not eating, not laxative use, not medication withdrawal, not previous surgeries, not recent illness, not recent sexual activity, not retching, not sick contacts, not suspicious food intake and not trauma    Relieved by:  Nothing  Worsened by:  Nothing  Ineffective treatments:  None tried  Associated symptoms: anorexia and dysuria    Associated symptoms: no belching, no chest pain, no chills, no constipation, no cough, no diarrhea, no fatigue, no fever, no flatus, no hematemesis, no hematochezia, no hematuria, no melena, no nausea, no shortness of breath, no sore throat and no vomiting    Risk factors: obesity    Risk factors: no alcohol abuse, no aspirin use, not elderly, has not had multiple surgeries, no NSAID use and no recent hospitalization        Prior to Admission Medications   Prescriptions Last Dose Informant Patient Reported? Taking? Cholecalciferol (VITAMIN D3) 2000 units capsule   Yes Yes   Sig: Take 3,000 Units by mouth    Cyanocobalamin (VITAMIN B-12 CR PO)   Yes Yes   Sig: Take by mouth   Multiple Vitamins-Minerals (MULTIVITAMIN ADULT) TABS   Yes Yes   Sig: Take by mouth   Omega-3 Fatty Acids (FISH OIL) 1200 MG CAPS   Yes Yes   Sig: Take by mouth daily   aspirin 81 mg chewable tablet   Yes Yes   Sig: Chew daily   atorvastatin (LIPITOR) 20 mg tablet   No Yes   Sig: TAKE 1 TABLET DAILY AS DIRECTED   econazole nitrate 1 % cream Not Taking  Yes No   Sig: Apply topically 2 (two) times a day as needed     Patient not taking: Reported on 5/12/2023   omeprazole (PriLOSEC) 40 MG capsule   Yes Yes   Sig: Take 40 mg by mouth daily   simethicone (MYLICON) 80 mg chewable tablet   Yes Yes   Sig: Chew 80 mg every 6 (six) hours as needed for flatulence      Facility-Administered Medications: None       Past Medical History:   Diagnosis Date   • GERD (gastroesophageal reflux disease)    • Heart disease        Past Surgical History:   Procedure Laterality Date   • CYSTOSCOPY  07/16/2021       Family History   Problem Relation Age of Onset   • Coronary artery disease Mother    • Stroke Mother    • Colon cancer Father    • Diabetes Maternal Grandfather    • Breast cancer Sister    • Breast cancer Sister      I have reviewed and agree with the history as documented  E-Cigarette/Vaping   • E-Cigarette Use Never User      E-Cigarette/Vaping Substances   • Nicotine No    • THC No    • CBD No    • Flavoring No    • Other No    • Unknown No      Social History     Tobacco Use   • Smoking status: Some Days     Types: Cigarettes   • Smokeless tobacco: Never   Vaping Use   • Vaping Use: Never used   Substance Use Topics   • Alcohol use: Yes     Comment: Social   • Drug use:  Yes Types: Marijuana       Review of Systems   Constitutional: Negative for chills, fatigue and fever  HENT: Negative for ear pain and sore throat  Eyes: Negative for pain and visual disturbance  Respiratory: Negative for cough and shortness of breath  Cardiovascular: Negative for chest pain and palpitations  Gastrointestinal: Positive for abdominal pain and anorexia  Negative for constipation, diarrhea, flatus, hematemesis, hematochezia, melena, nausea and vomiting  Genitourinary: Positive for dysuria  Negative for hematuria  Musculoskeletal: Negative for arthralgias and back pain  Skin: Negative for color change and rash  Neurological: Negative for seizures and syncope  All other systems reviewed and are negative  Physical Exam  Physical Exam  Vitals and nursing note reviewed  Constitutional:       General: He is not in acute distress  Appearance: He is well-developed  HENT:      Head: Normocephalic and atraumatic  Comments: Patient maintaining airway and secretions  No stridor   No brawniness under tongue  Mouth/Throat:      Mouth: Mucous membranes are moist    Eyes:      Extraocular Movements: Extraocular movements intact  Conjunctiva/sclera: Conjunctivae normal       Pupils: Pupils are equal, round, and reactive to light  Cardiovascular:      Rate and Rhythm: Normal rate and regular rhythm  Pulses:           Radial pulses are 2+ on the right side and 2+ on the left side  Dorsalis pedis pulses are 2+ on the right side and 2+ on the left side  Heart sounds: Normal heart sounds, S1 normal and S2 normal  No murmur heard  Pulmonary:      Effort: Pulmonary effort is normal  No respiratory distress  Breath sounds: Normal breath sounds  Abdominal:      General: Abdomen is protuberant  Bowel sounds are increased  Palpations: Abdomen is soft  Tenderness:  There is abdominal tenderness in the periumbilical area, suprapubic area and left lower quadrant  Musculoskeletal:         General: No swelling  Cervical back: Neck supple  Right lower leg: No edema  Left lower leg: No edema  Skin:     General: Skin is warm and dry  Capillary Refill: Capillary refill takes less than 2 seconds  Neurological:      General: No focal deficit present  Mental Status: He is alert and oriented to person, place, and time  GCS: GCS eye subscore is 4  GCS verbal subscore is 5  GCS motor subscore is 6  Cranial Nerves: Cranial nerves 2-12 are intact  Sensory: Sensation is intact  Motor: Motor function is intact  Coordination: Coordination is intact  Gait: Gait is intact     Psychiatric:         Mood and Affect: Mood normal          Vital Signs  ED Triage Vitals [05/12/23 1659]   Temperature Pulse Respirations Blood Pressure SpO2   98 2 °F (36 8 °C) 98 18 164/100 97 %      Temp Source Heart Rate Source Patient Position - Orthostatic VS BP Location FiO2 (%)   Oral Monitor Sitting Right arm --      Pain Score       2           Vitals:    05/12/23 1659 05/12/23 1825 05/12/23 2043   BP: 164/100 139/86 157/78   Pulse: 98 (!) 54 (!) 53   Patient Position - Orthostatic VS: Sitting Lying Lying         Visual Acuity      ED Medications  Medications   potassium chloride 20 mEq IVPB (premix) (has no administration in time range)   lactated ringers infusion (125 mL/hr Intravenous New Bag 5/12/23 2141)   oxyCODONE (ROXICODONE) immediate release tablet 10 mg (has no administration in time range)   oxyCODONE (ROXICODONE) IR tablet 5 mg (has no administration in time range)   piperacillin-tazobactam (ZOSYN) IVPB 3 375 g (has no administration in time range)   nicotine (NICODERM CQ) 7 mg/24hr TD 24 hr patch 1 patch (has no administration in time range)   ondansetron (ZOFRAN) injection 4 mg (has no administration in time range)   heparin (porcine) subcutaneous injection 5,000 Units (has no administration in time range)   acetaminophen (TYLENOL) tablet 975 mg (has no administration in time range)   pantoprazole (PROTONIX) EC tablet 40 mg (has no administration in time range)   lactated ringers bolus 1,000 mL (0 mL Intravenous Stopped 5/12/23 2039)   ketorolac (TORADOL) injection 30 mg (30 mg Intravenous Given 5/12/23 1747)   iohexol (OMNIPAQUE) 350 MG/ML injection (SINGLE-DOSE) 100 mL (100 mL Intravenous Given 5/12/23 1907)   metroNIDAZOLE (FLAGYL) tablet 500 mg (500 mg Oral Given 5/12/23 2040)   ceftriaxone (ROCEPHIN) 1 g/50 mL in dextrose IVPB (0 mg Intravenous Stopped 5/12/23 2134)       Diagnostic Studies  Results Reviewed     Procedure Component Value Units Date/Time    Lactic acid, plasma (w/reflex if result > 2 0) [[id number]]  (Normal) Collected: 05/12/23 1745    Lab Status: Final result Specimen: Blood from Arm, Right Updated: 05/12/23 1831     LACTIC ACID 0 8 mmol/L     Narrative:      Result may be elevated if tourniquet was used during collection      Basic metabolic panel [[id number]]  (Abnormal) Collected: 05/12/23 1745    Lab Status: Final result Specimen: Blood from Arm, Right Updated: 05/12/23 1828     Sodium 136 mmol/L      Potassium 3 4 mmol/L      Chloride 103 mmol/L      CO2 25 mmol/L      ANION GAP 8 mmol/L      BUN 11 mg/dL      Creatinine 0 85 mg/dL      Glucose 102 mg/dL      Calcium 9 8 mg/dL      eGFR 94 ml/min/1 73sq m     Narrative:      [facility] guidelines for Chronic Kidney Disease (CKD):   •  Stage 1 with normal or high GFR (GFR > 90 mL/min/1 73 square meters)  •  Stage 2 Mild CKD (GFR = 60-89 mL/min/1 73 square meters)  •  Stage 3A Moderate CKD (GFR = 45-59 mL/min/1 73 square meters)  •  Stage 3B Moderate CKD (GFR = 30-44 mL/min/1 73 square meters)  •  Stage 4 Severe CKD (GFR = 15-29 mL/min/1 73 square meters)  •  Stage 5 End Stage CKD (GFR <15 mL/min/1 73 square meters)  Note: GFR calculation is accurate only with a steady state creatinine    CBC and differential [[id number]]  (Abnormal) Collected: 05/12/23 1745    Lab Status: Final result Specimen: Blood from Arm, Right Updated: 05/12/23 1759     WBC 17 25 Thousand/uL      RBC 4 87 Million/uL      Hemoglobin 15 6 g/dL      Hematocrit 44 4 %      MCV 91 fL      MCH 32 0 pg      MCHC 35 1 g/dL      RDW 11 9 %      MPV 10 1 fL      Platelets 587 Thousands/uL      nRBC 0 /100 WBCs      Neutrophils Relative 75 %      Immat GRANS % 0 %      Lymphocytes Relative 15 %      Monocytes Relative 10 %      Eosinophils Relative 0 %      Basophils Relative 0 %      Neutrophils Absolute 12 76 Thousands/µL      Immature Grans Absolute 0 06 Thousand/uL      Lymphocytes Absolute 2 60 Thousands/µL      Monocytes Absolute 1 77 Thousand/µL      Eosinophils Absolute 0 01 Thousand/µL      Basophils Absolute 0 05 Thousands/µL     Urine Microscopic [[id number]]  (Abnormal) Collected: 05/12/23 1731    Lab Status: Final result Specimen: Urine, Other Updated: 05/12/23 1748     RBC, UA 20-30 /hpf      WBC, UA 1-2 /hpf      Epithelial Cells Occasional /hpf      Bacteria, UA None Seen /hpf      MUCUS THREADS Occasional    UA (URINE) with reflex to Scope [[id number]]  (Abnormal) Collected: 05/12/23 1731    Lab Status: Final result Specimen: Urine, Other Updated: 05/12/23 1738     Color, UA Yellow     Clarity, UA Clear     Specific Gravity, UA 1 018     pH, UA 6 0     Leukocytes, UA Negative     Nitrite, UA Negative     Protein, UA Trace mg/dl      Glucose, UA Negative mg/dl      Ketones, UA 40 (2+) mg/dl      Urobilinogen, UA <2 0 mg/dl      Bilirubin, UA Negative     Occult Blood, UA Small                 CT abdomen pelvis with contrast   ED Interpretation by Keyanna Recinos DO (05/12 1921)   No free air  Gallstones  Renal cyst   Pending official read      Final Result by Vonda Morales DO (05/12 2025)      Acute sigmoid diverticulitis with probable microperforation but no evidence for abscess  Surgical consultation may be considered  Cholelithiasis        Additional "incidental findings as above      Findings were communicated with Dr Jevon Villa on 5/12/2023 at 8:20 PM via HIPPA compliant electronic text messaging with confirmation of receipt by response text at 8:20 p m  Workstation performed: [id number]                    Procedures  Procedures         ED Course  ED Course as of 05/12/23 2144   Fri May 12, 2023   1711 Patient is a 71-year-old male coming in today with abdominal pain sent in from urgent center  On exam he does have mild tenderness suprapubic and left lower quadrant but nonperitoneal   Nontoxic-appearing in no acute distress  Will check UA and discussed with patient that they did a dip but not official UA  Urine culture was sent at urgent center  Will check CBC MP UA and CT    Disclosure: Voice to text software was used in the preparation of this document and could have resulted in translational errors       Occasional wrong word or \"sound a like\" substitutions may have occurred due to the inherent limitations of voice recognition software   Read the chart carefully and recognize, using context, where substitutions have occurred        I have independently reviewed external records are available to me to the level of detail possible within the time constraints of my patient care responsibilities in the ED        1811 Patient has leukocytosis without bands  No bacteria but noted RBC   2022 Per Radiology \"Your patient David Perches CAT scan demonstrates acute sigmoid diverticulitis  There are probably tiny locules of free air in the mesentery suggesting microperforation  No evidence for abscess  \"   2024 We will reach out to neurosurgery   2029 Dr Chong Covarrubias from surgery will be down for evaluation   2059 Patient aware of evaluation with general surgery resident  Patient feeling better and understands CT results  2142 Discussed with Chong Covarrubias  We had a detailed discussion of the patient's condition and case,  including need for admission    " Accepts to his/her service  Bed request/bridging orders placed  SBIRT 20yo+    Flowsheet Row Most Recent Value   Initial Alcohol Screen: US AUDIT-C     1  How often do you have a drink containing alcohol? 4 Filed at: 05/12/2023 1734   2  How many drinks containing alcohol do you have on a typical day you are drinking? 3 Filed at: 05/12/2023 1734   3a  Male UNDER 65: How often do you have five or more drinks on one occasion? 0 Filed at: 05/12/2023 1734   3b  FEMALE Any Age, or MALE 65+: How often do you have 4 or more drinks on one occassion? 0 Filed at: 05/12/2023 1734   Audit-C Score 7 Filed at: 05/12/2023 1734   Full Alcohol Screen: US AUDIT    4  How often during the last year have you found that you were not able to stop drinking once you had started? 0 Filed at: 05/12/2023 1734   5  How often during past year have you failed to do what was normally expected of you because of drinking? 0 Filed at: 05/12/2023 1734   6  How often in past year have you needed a first drink in the morning to get yourself going after a heavy drinking session? 0 Filed at: 05/12/2023 1734   7  How often in past year have you had feeling of guilt or remorse after drinking? 0 Filed at: 05/12/2023 1734   8  How often in past year have you been unable to remember what happened night before because you had been drinking? 0 Filed at: 05/12/2023 1734   9  Have you or someone else been injured as a result of your drinking? 0 Filed at: 05/12/2023 1734   10  Has a relative, friend, doctor or other health worker been concerned about your drinking and suggested you cut down?  0 Filed at: 05/12/2023 1734   AUDIT Total Score 7 Filed at: 05/12/2023 1734   SAMIRA: How many times in the past year have you    Used an illegal drug or used a prescription medication for non-medical reasons?  Never Filed at: 05/12/2023 1734                    Medical Decision Making      Differential diagnosis includes but not limited to:  Appendicitis, viral syndrome, constipation, AMI, NSTEMI, pneumonia, pneuothorax, gerd, gastritis,  mesenteric ischemia, mesenteric adenitis, pancreatitis, cholecystitis, choledocholithiasis, hepatitis, bowel obstruction, ileus, gastroenteritis, colitis, malignancy, AAA, perforation, toxicologic poisoning, renal infarct, acute kidney injury, splenic infarct, splenic injury, nephrolithiasis, UTI, muscular strain, intra-abdominal hematoma, hernia, testicular torsion, epididymitis, varicocele, hydrocele, phimosis, paraphimosis, balanitis proctitis, rectal pain, rectal prolapse, hemorrhoids, perirectal abscess, anorectal fistula       Abdominal pain: acute illness or injury  Diverticulitis: acute illness or injury  Gallstones: acute illness or injury  Other microscopic hematuria: acute illness or injury  Amount and/or Complexity of Data Reviewed  External Data Reviewed: notes  Details: UA dip at  with blood and ketones  Labs: ordered  Decision-making details documented in ED Course  Details: UA here shows blood and ketones  Leukocytosis without bands  LA WNL  Radiology: ordered and independent interpretation performed  Decision-making details documented in ED Course  Details: Yoon Myers reports that this is diverticulitis with perf  Discussion of management or test interpretation with external provider(s): Discussed with general surgery resident    Risk  Prescription drug management  Decision regarding hospitalization            Disposition  Final diagnoses:   Abdominal pain   Other microscopic hematuria   Diverticulitis   Gallstones     Time reflects when diagnosis was documented in both MDM as applicable and the Disposition within this note     Time User Action Codes Description Comment    5/12/2023  5:17 PM Kellee Stinson Add [R10 9] Abdominal pain     5/12/2023  6:12 PM Shameka Stinson Add [R31 29] Other microscopic hematuria     5/12/2023  8:30 PM Chelsea Neumann [K57 92] Diverticulitis     5/12/2023  8:30 PM Lester Marcano Add [K80 20] Gallstones       ED Disposition     ED Disposition   Admit    Condition   Stable    Date/Time   Fri May 12, 2023  9:43 PM    Comment   Case was discussed with Dr Iron Roa and the patient's admission status was agreed to be Admission Status: observation status to the service of Dr Celine Moralez    None         Patient's Medications   Discharge Prescriptions    No medications on file       No discharge procedures on file      PDMP Review     None          ED Provider  Electronically Signed by           Isidor Homans, DO  05/12/23 6571

## 2023-05-12 NOTE — PROGRESS NOTES
[facility]        NAME: Kayleen Dutton is a 65 y o  male  : 1960    MRN: [id number]  DATE: May 12, 2023  TIME: 4:52 PM      Assessment and Plan     Lower abdominal pain [R10 30]  1  Lower abdominal pain  POCT urine dip    Urine culture    Transfer to other facility          poct urine dip shows small amount of blood, 40+  Ketones, trace protein  No leukocytes or nitrates seen on dip  Urine culture sent  Patient agreeable to proceed to the ER for further evaluation by POV to [facility]  Patient Instructions     Proceed to the ER for further evaluation  Chief Complaint     Chief Complaint   Patient presents with   • Fever     Started w/ constant low abd pain/cramp yest, chills & low grade fever  Small semi formed stool yest  Chronic gas  Slightly worse w/ urination  Denies LBP         History of Present Illness     Patient is a 50-year-old male who presents with lower abdominal pain that started 1 day ago  States the pain is constant  States he has associated cramping in the lower abdomen  Reports decreased bowel movements  Reports he has not had a bowel movement today  Reports abdominal bloating  Reports he does have increased pain with urination  Reports low-grade fever and chills  Reports fever Tmax at home was 100 5  States he did not take anything for the fever prior to arrival   Reports he does have a history of chronic gas but states it has been worse  States he took Gas-X yesterday with no relief  Denies urgency  Denies frequency  Denies flank pain or back pain  Denies nausea, vomiting, or diarrhea  Denies history of kidney stones  Denies history of abdominal surgeries  States he does take 81 mg aspirin daily  Review of Systems     Review of Systems   Constitutional: Positive for chills and fever  Gastrointestinal: Positive for abdominal distention, abdominal pain and constipation  Negative for diarrhea, nausea and vomiting     Genitourinary: Positive for dysuria  Negative for flank pain, frequency and urgency  Musculoskeletal: Negative for back pain  All other systems reviewed and are negative  Current Medications       Current Outpatient Medications:   •  aspirin 81 mg chewable tablet, Chew daily, Disp: , Rfl:   •  atorvastatin (LIPITOR) 20 mg tablet, TAKE 1 TABLET DAILY AS DIRECTED, Disp: 90 tablet, Rfl: 3  •  Cholecalciferol (VITAMIN D3) 2000 units capsule, Take 3,000 Units by mouth , Disp: , Rfl:   •  Cyanocobalamin (VITAMIN B-12 CR PO), Take by mouth, Disp: , Rfl:   •  Multiple Vitamins-Minerals (MULTIVITAMIN ADULT) TABS, Take by mouth, Disp: , Rfl:   •  Omega-3 Fatty Acids (FISH OIL) 1200 MG CAPS, Take by mouth daily, Disp: , Rfl:   •  omeprazole (PriLOSEC) 40 MG capsule, Take 40 mg by mouth daily, Disp: , Rfl:   •  simethicone (MYLICON) 80 mg chewable tablet, Chew 80 mg every 6 (six) hours as needed for flatulence, Disp: , Rfl:   •  econazole nitrate 1 % cream, Apply topically 2 (two) times a day as needed   (Patient not taking: Reported on 5/12/2023), Disp: , Rfl:     Current Allergies     Allergies as of 05/12/2023 - Reviewed 05/12/2023   Allergen Reaction Noted   • Sulfa antibiotics  10/14/2013              The following portions of the patient's history were reviewed and updated as appropriate: allergies, current medications, past family history, past medical history, past social history, past surgical history and problem list      Past Medical History:   Diagnosis Date   • GERD (gastroesophageal reflux disease)    • Heart disease        Past Surgical History:   Procedure Laterality Date   • CYSTOSCOPY  07/16/2021       Family History   Problem Relation Age of Onset   • Coronary artery disease Mother    • Stroke Mother    • Colon cancer Father    • Diabetes Maternal Grandfather    • Breast cancer Sister    • Breast cancer Sister          Medications have been verified          Objective     Pulse 74   Temp 99 8 °F (37 7 °C)   Resp "18   Ht 5' 7\" (1 702 m)   Wt 88 kg (194 lb)   SpO2 97%   BMI 30 38 kg/m²   No LMP for male patient  Physical Exam     Physical Exam  Vitals and nursing note reviewed  Constitutional:       General: He is not in acute distress  Appearance: Normal appearance  He is not ill-appearing, toxic-appearing or diaphoretic  Cardiovascular:      Rate and Rhythm: Normal rate  Pulses: Normal pulses  Heart sounds: Normal heart sounds, S1 normal and S2 normal    Pulmonary:      Effort: Pulmonary effort is normal       Breath sounds: Normal breath sounds and air entry  No stridor, decreased air movement or transmitted upper airway sounds  No decreased breath sounds, wheezing, rhonchi or rales  Abdominal:      General: Bowel sounds are increased  Tenderness: There is abdominal tenderness (Tender throughout abdomen, patient reports it is worse when palpating left side of abdomen  )  There is no right CVA tenderness or left CVA tenderness  Comments: Rounded abdomen  Skin:     General: Skin is warm  Capillary Refill: Capillary refill takes less than 2 seconds  Neurological:      General: No focal deficit present  Mental Status: He is alert  Psychiatric:         Mood and Affect: Mood normal          Behavior: Behavior normal          Thought Content:  Thought content normal          Judgment: Judgment normal        "

## 2023-05-12 NOTE — Clinical Note
Case was discussed with Dr Faiza Riley and the patient's admission status was agreed to be Admission Status: observation status to the service of

## 2023-05-13 LAB
ANION GAP SERPL CALCULATED.3IONS-SCNC: 9 MMOL/L (ref 4–13)
BASOPHILS # BLD AUTO: 0.03 THOUSANDS/ÂΜL (ref 0–0.1)
BASOPHILS NFR BLD AUTO: 0 % (ref 0–1)
BUN SERPL-MCNC: 12 MG/DL (ref 5–25)
CALCIUM SERPL-MCNC: 9.2 MG/DL (ref 8.4–10.2)
CHLORIDE SERPL-SCNC: 106 MMOL/L (ref 96–108)
CO2 SERPL-SCNC: 23 MMOL/L (ref 21–32)
CREAT SERPL-MCNC: 0.8 MG/DL (ref 0.6–1.3)
EOSINOPHIL # BLD AUTO: 0.09 THOUSAND/ÂΜL (ref 0–0.61)
EOSINOPHIL NFR BLD AUTO: 1 % (ref 0–6)
ERYTHROCYTE [DISTWIDTH] IN BLOOD BY AUTOMATED COUNT: 11.9 % (ref 11.6–15.1)
GFR SERPL CREATININE-BSD FRML MDRD: 96 ML/MIN/1.73SQ M
GLUCOSE P FAST SERPL-MCNC: 82 MG/DL (ref 65–99)
GLUCOSE SERPL-MCNC: 82 MG/DL (ref 65–140)
HCT VFR BLD AUTO: 39.9 % (ref 36.5–49.3)
HGB BLD-MCNC: 13.8 G/DL (ref 12–17)
IMM GRANULOCYTES # BLD AUTO: 0.05 THOUSAND/UL (ref 0–0.2)
IMM GRANULOCYTES NFR BLD AUTO: 0 % (ref 0–2)
LYMPHOCYTES # BLD AUTO: 2.32 THOUSANDS/ÂΜL (ref 0.6–4.47)
LYMPHOCYTES NFR BLD AUTO: 17 % (ref 14–44)
MCH RBC QN AUTO: 32.2 PG (ref 26.8–34.3)
MCHC RBC AUTO-ENTMCNC: 34.6 G/DL (ref 31.4–37.4)
MCV RBC AUTO: 93 FL (ref 82–98)
MONOCYTES # BLD AUTO: 1.34 THOUSAND/ÂΜL (ref 0.17–1.22)
MONOCYTES NFR BLD AUTO: 10 % (ref 4–12)
NEUTROPHILS # BLD AUTO: 9.57 THOUSANDS/ÂΜL (ref 1.85–7.62)
NEUTS SEG NFR BLD AUTO: 72 % (ref 43–75)
NRBC BLD AUTO-RTO: 0 /100 WBCS
PLATELET # BLD AUTO: 179 THOUSANDS/UL (ref 149–390)
PMV BLD AUTO: 10.6 FL (ref 8.9–12.7)
POTASSIUM SERPL-SCNC: 3.6 MMOL/L (ref 3.5–5.3)
RBC # BLD AUTO: 4.28 MILLION/UL (ref 3.88–5.62)
SODIUM SERPL-SCNC: 138 MMOL/L (ref 135–147)
WBC # BLD AUTO: 13.4 THOUSAND/UL (ref 4.31–10.16)

## 2023-05-13 RX ORDER — POTASSIUM CHLORIDE 20 MEQ/1
20 TABLET, EXTENDED RELEASE ORAL ONCE
Status: COMPLETED | OUTPATIENT
Start: 2023-05-13 | End: 2023-05-13

## 2023-05-13 RX ORDER — ATORVASTATIN CALCIUM 20 MG/1
20 TABLET, FILM COATED ORAL
Status: DISCONTINUED | OUTPATIENT
Start: 2023-05-13 | End: 2023-05-14 | Stop reason: HOSPADM

## 2023-05-13 RX ADMIN — PIPERACILLIN SODIUM AND TAZOBACTAM SODIUM 3.38 G: 36; 4.5 INJECTION, POWDER, LYOPHILIZED, FOR SOLUTION INTRAVENOUS at 12:05

## 2023-05-13 RX ADMIN — HEPARIN SODIUM 5000 UNITS: 5000 INJECTION INTRAVENOUS; SUBCUTANEOUS at 13:38

## 2023-05-13 RX ADMIN — HEPARIN SODIUM 5000 UNITS: 5000 INJECTION INTRAVENOUS; SUBCUTANEOUS at 05:22

## 2023-05-13 RX ADMIN — SODIUM CHLORIDE, SODIUM LACTATE, POTASSIUM CHLORIDE, AND CALCIUM CHLORIDE 125 ML/HR: .6; .31; .03; .02 INJECTION, SOLUTION INTRAVENOUS at 08:58

## 2023-05-13 RX ADMIN — PIPERACILLIN SODIUM AND TAZOBACTAM SODIUM 3.38 G: 36; 4.5 INJECTION, POWDER, LYOPHILIZED, FOR SOLUTION INTRAVENOUS at 17:16

## 2023-05-13 RX ADMIN — ATORVASTATIN CALCIUM 20 MG: 20 TABLET, FILM COATED ORAL at 17:15

## 2023-05-13 RX ADMIN — ACETAMINOPHEN 975 MG: 325 TABLET ORAL at 21:19

## 2023-05-13 RX ADMIN — PANTOPRAZOLE SODIUM 40 MG: 40 TABLET, DELAYED RELEASE ORAL at 05:21

## 2023-05-13 RX ADMIN — PIPERACILLIN SODIUM AND TAZOBACTAM SODIUM 3.38 G: 36; 4.5 INJECTION, POWDER, LYOPHILIZED, FOR SOLUTION INTRAVENOUS at 05:18

## 2023-05-13 RX ADMIN — PIPERACILLIN SODIUM AND TAZOBACTAM SODIUM 3.38 G: 36; 4.5 INJECTION, POWDER, LYOPHILIZED, FOR SOLUTION INTRAVENOUS at 23:15

## 2023-05-13 RX ADMIN — HEPARIN SODIUM 5000 UNITS: 5000 INJECTION INTRAVENOUS; SUBCUTANEOUS at 21:20

## 2023-05-13 RX ADMIN — POTASSIUM CHLORIDE 20 MEQ: 1500 TABLET, EXTENDED RELEASE ORAL at 12:05

## 2023-05-13 RX ADMIN — ACETAMINOPHEN 975 MG: 325 TABLET ORAL at 13:38

## 2023-05-13 NOTE — H&P
"General Surgery  History and Physical  Sharon Puentes 65 y o  male MRN: [id number]  Unit/Bed#: ED-06 Encounter: [id number]    Assessment and Plan:  Sharon Puentes is a 65 y o  male with a PMH GERD and diverticulosis and FHx colon cancer (father dx age 36 /  age 46, sisters x 2 breast cancer), who p/w first episode acute sigmoid diverticulitis (no obvious abscess, probable microperf)    Tmax at home 100 5F, afebrile in ED  WBC 17  Last cscope  with only diverticulitis noted, Q5y recall  Nontender exam    Recommend:  Admission observation  IV abx - zosyn  Clear liquid diet  F/u am CBC      History of Present Illness     HPI:  Sharon Puentes is a 65 y o  male with a PMH GERD, diverticulosis, who p/w 1 day h/o RLQ abdominal pain and anorexia  Patient states this is the first time experiencing pain like this before  He notes focal pain to his RLQ that began yesterday as well as loss of appetite  Also notes feeling discomfort with urination, although denies gas with urination/voiding, blood, burning or stool from the urethra  He denies bloody BMs, and states his last BM was yesterday  At home Tmax was 100 5F, so he presented to urgent care with normal vitals at the time and was sent to ED for further evaluation  He remains afebrile currently and is nontender on exam, although recently administered analgesia  He has a FHx colon cancer  His father was dx at 36 and  at 46  Two sisters with breast cancer  Denies genetic markers  He states he began screening cscopes at 40years old  Last cscope  with only notable diverticulosis and recommendation for recall in 5 years  Otherwise healthy aside from GERD, no prior abdominal surgeries  ED workup was revealing of WBC 17, CT scan with sigmoid diverticulitis \"probable microperforation\", no abscess  Review of Systems   Constitutional: Positive for fever  HENT: Negative  Eyes: Negative  Respiratory: Negative  Cardiovascular: Negative    " Gastrointestinal: Positive for abdominal pain  Negative for constipation, diarrhea, nausea and vomiting  Endocrine: Negative  Genitourinary: Negative  Negative for decreased urine volume, difficulty urinating, hematuria and urgency  Musculoskeletal: Negative  Skin: Negative  Allergic/Immunologic: Negative  Neurological: Negative  Hematological: Negative  Psychiatric/Behavioral: Negative  All other systems reviewed and are negative  Historical Information   Past Medical History:   Diagnosis Date   • GERD (gastroesophageal reflux disease)    • Heart disease      Past Surgical History:   Procedure Laterality Date   • CYSTOSCOPY  07/16/2021     Social History   Social History     Substance and Sexual Activity   Alcohol Use Yes    Comment: Social     Social History     Substance and Sexual Activity   Drug Use Yes   • Types: Marijuana     Social History     Tobacco Use   Smoking Status Some Days   • Types: Cigarettes   Smokeless Tobacco Never     Family History:   Family History   Problem Relation Age of Onset   • Coronary artery disease Mother    • Stroke Mother    • Colon cancer Father    • Diabetes Maternal Grandfather    • Breast cancer Sister    • Breast cancer Sister        Meds/Allergies   PTA meds:   Prior to Admission Medications   Prescriptions Last Dose Informant Patient Reported? Taking?    Cholecalciferol (VITAMIN D3) 2000 units capsule   Yes Yes   Sig: Take 3,000 Units by mouth    Cyanocobalamin (VITAMIN B-12 CR PO)   Yes Yes   Sig: Take by mouth   Multiple Vitamins-Minerals (MULTIVITAMIN ADULT) TABS   Yes Yes   Sig: Take by mouth   Omega-3 Fatty Acids (FISH OIL) 1200 MG CAPS   Yes Yes   Sig: Take by mouth daily   aspirin 81 mg chewable tablet   Yes Yes   Sig: Chew daily   atorvastatin (LIPITOR) 20 mg tablet   No Yes   Sig: TAKE 1 TABLET DAILY AS DIRECTED   econazole nitrate 1 % cream Not Taking  Yes No   Sig: Apply topically 2 (two) times a day as needed     Patient not taking: Reported on 5/12/2023   omeprazole (PriLOSEC) 40 MG capsule   Yes Yes   Sig: Take 40 mg by mouth daily   simethicone (MYLICON) 80 mg chewable tablet   Yes Yes   Sig: Chew 80 mg every 6 (six) hours as needed for flatulence      Facility-Administered Medications: None     Allergies   Allergen Reactions   • Sulfa Antibiotics        Objective   First Vitals:   Blood Pressure: 164/100 (05/12/23 1659)  Pulse: 98 (05/12/23 1659)  Temperature: 98 2 °F (36 8 °C) (05/12/23 1659)  Temp Source: Oral (05/12/23 1659)  Respirations: 18 (05/12/23 1659)  Weight - Scale: 87 5 kg (192 lb 14 4 oz) (05/12/23 1659)  SpO2: 97 % (05/12/23 1659)    Current Vitals:   Blood Pressure: 157/78 (05/12/23 2043)  Pulse: (!) 53 (05/12/23 2043)  Temperature: 98 2 °F (36 8 °C) (05/12/23 1659)  Temp Source: Oral (05/12/23 1659)  Respirations: 16 (05/12/23 2043)  Weight - Scale: 87 5 kg (192 lb 14 4 oz) (05/12/23 1659)  SpO2: 95 % (05/12/23 2043)    No intake or output data in the 24 hours ending 05/12/23 2120    Invasive Devices     Peripheral Intravenous Line  Duration           Peripheral IV 05/12/23 Right;Ventral (anterior) Forearm <1 day                Physical Exam  Vitals reviewed  Constitutional:       Appearance: He is not ill-appearing, toxic-appearing or diaphoretic  HENT:      Head: Normocephalic and atraumatic  Right Ear: External ear normal       Left Ear: External ear normal       Nose: Nose normal       Mouth/Throat:      Mouth: Mucous membranes are moist       Pharynx: Oropharynx is clear  Eyes:      Extraocular Movements: Extraocular movements intact  Conjunctiva/sclera: Conjunctivae normal       Comments: glasses   Cardiovascular:      Rate and Rhythm: Normal rate  Pulmonary:      Effort: Pulmonary effort is normal  No respiratory distress  Abdominal:      Palpations: Abdomen is soft  Tenderness: There is no abdominal tenderness  There is no guarding or rebound     Genitourinary:     Comments: deferred  Musculoskeletal:         General: No swelling  Normal range of motion  Cervical back: Normal range of motion  Skin:     General: Skin is warm and dry  Neurological:      General: No focal deficit present  Mental Status: He is alert  Cranial Nerves: No cranial nerve deficit  Psychiatric:         Mood and Affect: Mood normal          Thought Content: Thought content normal          Lab Results:   CBC:   Lab Results   Component Value Date    WBC 17 25 (H) 05/12/2023    HGB 15 6 05/12/2023    HCT 44 4 05/12/2023    MCV 91 05/12/2023     05/12/2023    MCH 32 0 05/12/2023    MCHC 35 1 05/12/2023    RDW 11 9 05/12/2023    MPV 10 1 05/12/2023    NRBC 0 05/12/2023   , CMP:   Lab Results   Component Value Date    SODIUM 136 05/12/2023    K 3 4 (L) 05/12/2023     05/12/2023    CO2 25 05/12/2023    BUN 11 05/12/2023    CREATININE 0 85 05/12/2023    CALCIUM 9 8 05/12/2023    EGFR 94 05/12/2023   , Coagulation: No results found for: PT, INR, APTT, Urinalysis:   Lab Results   Component Value Date    COLORU Yellow 05/12/2023    COLORU roxann 05/12/2023    CLARITYU Clear 05/12/2023    CLARITYU clear 05/12/2023    SPECGRAV 1 018 05/12/2023    PHUR 6 0 05/12/2023    LEUKOCYTESUR Negative 05/12/2023    LEUKOCYTESUR negative 05/12/2023    NITRITE Negative 05/12/2023    NITRITE negative 05/12/2023    GLUCOSEU Negative 05/12/2023    GLUCOSEU negative 05/12/2023    KETONESU 40 (2+) (A) 05/12/2023    KETONESU 40 05/12/2023    BILIRUBINUR Negative 05/12/2023    BILIRUBINUR negative 05/12/2023    BLOODU Small (A) 05/12/2023    BLOODU small 05/12/2023   , Amylase: No results found for: AMYLASE, Lipase: No results found for: LIPASE  Imaging: I have personally reviewed pertinent reports  EKG, Pathology, and Other Studies: I have personally reviewed pertinent reports        Code Status: No Order  Advance Directive and Living Will:      Power of :    POLST:      Counseling / Coordination of Care  Total floor / unit time spent today 45 minutes  This involved direct patient contact where I performed a full history and physical, reviewed previous records, and reviewed laboratory and other diagnostic studies  Greater than 50% of total time was spent with the patient and / or family counseling and / or coordination of care      Yvan Wilder MD  5/12/2023

## 2023-05-13 NOTE — PLAN OF CARE
Problem: PAIN - ADULT  Goal: Verbalizes/displays adequate comfort level or baseline comfort level  Description: Interventions:  - Encourage patient to monitor pain and request assistance  - Assess pain using appropriate pain scale  - Administer analgesics based on type and severity of pain and evaluate response  - Implement non-pharmacological measures as appropriate and evaluate response  - Consider cultural and social influences on pain and pain management  - Notify physician/advanced practitioner if interventions unsuccessful or patient reports new pain  Outcome: Progressing     Problem: INFECTION - ADULT  Goal: Absence or prevention of progression during hospitalization  Description: INTERVENTIONS:  - Assess and monitor for signs and symptoms of infection  - Monitor lab/diagnostic results  - Monitor all insertion sites, i e  indwelling lines, tubes, and drains  - Monitor endotracheal if appropriate and nasal secretions for changes in amount and color  - Dresser appropriate cooling/warming therapies per order  - Administer medications as ordered  - Instruct and encourage patient and family to use good hand hygiene technique  - Identify and instruct in appropriate isolation precautions for identified infection/condition  Outcome: Progressing     Problem: SAFETY ADULT  Goal: Patient will remain free of falls  Description: INTERVENTIONS:  - Educate patient/family on patient safety including physical limitations  - Instruct patient to call for assistance with activity   - Consult OT/PT to assist with strengthening/mobility   - Keep Call bell within reach  - Keep bed low and locked with side rails adjusted as appropriate  - Keep care items and personal belongings within reach  - Initiate and maintain comfort rounds  - Make Fall Risk Sign visible to staff  - Apply yellow socks and bracelet for high fall risk patients  - Consider moving patient to room near nurses station  Outcome: Progressing     Problem: DISCHARGE PLANNING  Goal: Discharge to home or other facility with appropriate resources  Description: INTERVENTIONS:  - Identify barriers to discharge w/patient and caregiver  - Arrange for needed discharge resources and transportation as appropriate  - Identify discharge learning needs (meds, wound care, etc )  - Arrange for interpretive services to assist at discharge as needed  - Refer to Case Management Department for coordinating discharge planning if the patient needs post-hospital services based on physician/advanced practitioner order or complex needs related to functional status, cognitive ability, or social support system  Outcome: Progressing     Problem: Knowledge Deficit  Goal: Patient/family/caregiver demonstrates understanding of disease process, treatment plan, medications, and discharge instructions  Description: Complete learning assessment and assess knowledge base    Interventions:  - Provide teaching at level of understanding  - Provide teaching via preferred learning methods  Outcome: Progressing

## 2023-05-13 NOTE — UTILIZATION REVIEW
"Initial Clinical Review    Admission: Date/Time/Statement:   Admission Orders (From admission, onward)     Ordered        05/12/23 2133  Place in Observation  Once                      Orders Placed This Encounter   Procedures   • Place in Observation     Standing Status:   Standing     Number of Occurrences:   1     Order Specific Question:   Level of Care     Answer:   Med Surg [16]     ED Arrival Information     Expected   5/12/2023     Arrival   5/12/2023 16:54    Acuity   Urgent            Means of arrival   Walk-In    Escorted by   Self    Service   Surgery-General    Admission type   Emergency            Arrival complaint   Lower abdominal pain           Chief Complaint   Patient presents with   • Abdominal Pain     Patient c/o generalized lower abdominal pain and and pain with urination  Patient denies N/V/D  Initial Presentation: 65 y o  male  Presents to ED from urgent care  due to RLQ abdominal pain and anorexia, discomfort with urination, T  100 5 at home, ED workup was revealing of WBC 17, CT scan with sigmoid diverticulitis \"probable microperforation\", no abscess  Exam abd is soft non tender, Currently afebrile   Admit as observation to med surg for acute sigmoid diverticulitis  Plan IV abx, clear liquid diet, IV fluids, repeat CBC in am        ED Triage Vitals [05/12/23 1659]   Temperature Pulse Respirations Blood Pressure SpO2   98 2 °F (36 8 °C) 98 18 164/100 97 %      Temp Source Heart Rate Source Patient Position - Orthostatic VS BP Location FiO2 (%)   Oral Monitor Sitting Right arm --      Pain Score       2          Wt Readings from Last 1 Encounters:   05/12/23 87 5 kg (192 lb 14 4 oz)     Additional Vital Signs:      Date/Time Temp Pulse Resp BP MAP (mmHg) SpO2 O2 Device Patient Position - Orthostatic VS   05/13/23 07:06:19 98 6 °F (37 °C) 61 17 141/80 100 95 % -- --   05/13/23 05:11:06 98 3 °F (36 8 °C) 51 Abnormal  -- 143/86 105 97 % -- --   05/12/23 22:17:48 98 3 °F (36 8 °C) 56 16 " 142/87 105 95 % -- --   05/12/23 2043 -- 53 Abnormal  16 157/78 -- 95 % None (Room air) Lying   05/12/23 1825 -- 54 Abnormal  16 139/86 109 95 % None (Room air) Lying       Pertinent Labs/Diagnostic Test Results:   CT abdomen pelvis with contrast   ED Interpretation by Davon Chawla DO (05/12 1921)   No free air  Gallstones  Renal cyst   Pending official read      Final Result by Elana Ramirez DO (05/12 2025)      Acute sigmoid diverticulitis with probable microperforation but no evidence for abscess  Surgical consultation may be considered  Cholelithiasis  Additional incidental findings as above      Findings were communicated with Dr Sanjay Vale on 5/12/2023 at 8:20 PM via HIPPA compliant electronic text messaging with confirmation of receipt by response text at 8:20 p m           Workstation performed: [id number]               Results from last 7 days   Lab Units 05/13/23  0517 05/12/23  1745   WBC Thousand/uL 13 40* 17 25*   HEMOGLOBIN g/dL 13 8 15 6   HEMATOCRIT % 39 9 44 4   PLATELETS Thousands/uL 179 198   NEUTROS ABS Thousands/µL 9 57* 12 76*         Results from last 7 days   Lab Units 05/13/23  0517 05/12/23  1745   SODIUM mmol/L 138 136   POTASSIUM mmol/L 3 6 3 4*   CHLORIDE mmol/L 106 103   CO2 mmol/L 23 25   ANION GAP mmol/L 9 8   BUN mg/dL 12 11   CREATININE mg/dL 0 80 0 85   EGFR ml/min/1 73sq m 96 94   CALCIUM mg/dL 9 2 9 8             Results from last 7 days   Lab Units 05/13/23  0517 05/12/23  1745   GLUCOSE RANDOM mg/dL 82 102             No results found for: BETA-HYDROXYBUTYRATE                                       Results from last 7 days   Lab Units 05/12/23  1745   LACTIC ACID mmol/L 0 8                                                 Results from last 7 days   Lab Units 05/12/23  1731 05/12/23  1629   CLARITY UA  Clear clear   COLOR UA  Yellow roxann   SPEC GRAV UA  1 018  --    PH UA  6 0  --    GLUCOSE UA mg/dl Negative negative   KETONES UA mg/dl 40 (2+)* 40 BLOOD UA  Small* small   PROTEIN UA mg/dl Trace* trace   NITRITE UA  Negative negative   BILIRUBIN UA  Negative  --    BILIRUBIN UA POC   --  negative   UROBILINOGEN UA   --  0 2   UROBILINOGEN UA (BE) mg/dl <2 0  --    LEUKOCYTES UA  Negative negative   WBC UA /hpf 1-2  --    RBC UA /hpf 20-30*  --    BACTERIA UA /hpf None Seen  --    EPITHELIAL CELLS WET PREP /hpf Occasional  --    MUCUS THREADS  Occasional*  --                                                ED Treatment:   Medication Administration from 05/12/2023 1640 to 05/12/2023 2211       Date/Time Order Dose Route Action Action by Comments     05/12/2023 2039 EDT lactated ringers bolus 1,000 mL 0 mL Intravenous Stopped Lulu Perdomo RN --     05/12/2023 1745 EDT lactated ringers bolus 1,000 mL 1,000 mL Intravenous New Bag Freddie Jalloh RN --     05/12/2023 1747 EDT ketorolac (TORADOL) injection 30 mg 30 mg Intravenous Given Freddie Jalloh RN --     05/12/2023 1907 EDT iohexol (OMNIPAQUE) 350 MG/ML injection (SINGLE-DOSE) 100 mL 100 mL Intravenous Given Reid Friedman --     05/12/2023 2040 EDT metroNIDAZOLE (FLAGYL) tablet 500 mg 500 mg Oral Given Lulu Perdomo RN --     05/12/2023 2134 EDT ceftriaxone (ROCEPHIN) 1 g/50 mL in dextrose IVPB 0 mg Intravenous Stopped Lulu Perdomo RN --     05/12/2023 2040 EDT ceftriaxone (ROCEPHIN) 1 g/50 mL in dextrose IVPB 1,000 mg Intravenous New Bag Lulu Perdomo RN --     05/12/2023 2144 EDT potassium chloride 20 mEq IVPB (premix) 20 mEq Intravenous New Jenni Perdomo RN --     05/12/2023 2141 EDT lactated ringers infusion 125 mL/hr Intravenous New Jenni Grant RN --        Past Medical History:   Diagnosis Date   • GERD (gastroesophageal reflux disease)    • Heart disease      Present on Admission:  **None**      Admitting Diagnosis: Diverticulitis [K57 92]  Abdominal pain [R10 9]  Gallstones [K80 20]  Other microscopic hematuria [R31 29]  Age/Sex: 65 y o  male  Admission Orders:  Scheduled Medications:  acetaminophen, 975 mg, Oral, Q8H RITA  heparin (porcine), 5,000 Units, Subcutaneous, Q8H [address]  nicotine, 1 patch, Transdermal, Daily  pantoprazole, 40 mg, Oral, Early Morning  piperacillin-tazobactam, 3 375 g, Intravenous, Q6H      Continuous IV Infusions:  lactated ringers, 125 mL/hr, Intravenous, Continuous      PRN Meds:  ondansetron, 4 mg, Intravenous, Q6H PRN  oxyCODONE, 10 mg, Oral, Q4H PRN  oxyCODONE, 5 mg, Oral, Q4H PRN    Up as tolerated, surgical clrear liquid diet    I/O    SCD's     None    Network Utilization Review Department  ATTENTION: Please call with any questions or concerns to [phone] and carefully listen to the prompts so that you are directed to the right person  All voicemails are confidential   Liu Whitley all requests for admission clinical reviews, approved or denied determinations and any other requests to dedicated fax number below belonging to the campus where the patient is receiving treatment   List of dedicated fax numbers for the Facilities:  [address] DENIALS (Administrative/Medical Necessity) [phone]   [address] (Maternity/NICU/Pediatrics) [phone]   0 Penny Mcghee [phone]   [facility] [phone]   [address] [place] [address] [address] [phone]   1553 [facility] 134 815 [facility] [phone]

## 2023-05-13 NOTE — PROGRESS NOTES
Progress Note - Angela Major 65 y o  male MRN: [id number]    Unit/Bed#: [place] 2 -01 Encounter: [id number]      Assessment:  Angela Major is a 65 y o  male who p/w first episode acute sigmoid diverticulitis    VSS afebrile  WBC 13 from 17    Plan: On zosyn, will transition to augmentin on d/c  CLD this morning - patient tolerated, advance to lo res for lunch and if tolerates anticipate d/c home today  Will need outpt f/u for rpt cscope in 6 weeks post acute phase      Subjective:   Patient seen and examined bedside  No acute complaints  No nausea or emesis  Hadn't had clears aside from water earlier this morning  Was able to tolerate full tray of clears for breakfast without nausea or emesis  Willing to transition to lo res trial for lunch  Pain well controlled  No BM overnight  Passing flatus    Objective:     Vitals: Blood pressure 141/80, pulse 61, temperature 98 6 °F (37 °C), resp  rate 17, weight 87 5 kg (192 lb 14 4 oz), SpO2 95 %  ,Body mass index is 30 21 kg/m²  No intake or output data in the 24 hours ending 05/13/23 1128    Physical Exam:   General - no acute distress, responsive and cooperative  CV - warm, regular rate  Pulm - normal work of breathing, no respiratory distress  Abd - soft, nondistended, minimally tender RLQ  Neuro - m/s grossly intact, cn grossly intact  Ext - moving all extremities       Invasive Devices     Peripheral Intravenous Line  Duration           Peripheral IV 05/12/23 Right;Ventral (anterior) Forearm <1 day                Lab, Imaging and other studies: I have personally reviewed pertinent reports      VTE Pharmacologic Prophylaxis: Heparin  VTE Mechanical Prophylaxis: sequential compression device

## 2023-05-14 VITALS
RESPIRATION RATE: 16 BRPM | SYSTOLIC BLOOD PRESSURE: 155 MMHG | TEMPERATURE: 98.2 F | BODY MASS INDEX: 30.21 KG/M2 | OXYGEN SATURATION: 96 % | WEIGHT: 192.9 LBS | HEART RATE: 51 BPM | DIASTOLIC BLOOD PRESSURE: 89 MMHG

## 2023-05-14 PROBLEM — K57.92 DIVERTICULITIS: Status: ACTIVE | Noted: 2023-05-14

## 2023-05-14 LAB
BACTERIA UR CULT: NORMAL
BASOPHILS # BLD AUTO: 0.05 THOUSANDS/ÂΜL (ref 0–0.1)
BASOPHILS NFR BLD AUTO: 0 % (ref 0–1)
EOSINOPHIL # BLD AUTO: 0.12 THOUSAND/ÂΜL (ref 0–0.61)
EOSINOPHIL NFR BLD AUTO: 1 % (ref 0–6)
ERYTHROCYTE [DISTWIDTH] IN BLOOD BY AUTOMATED COUNT: 11.9 % (ref 11.6–15.1)
HCT VFR BLD AUTO: 40.4 % (ref 36.5–49.3)
HGB BLD-MCNC: 14.1 G/DL (ref 12–17)
IMM GRANULOCYTES # BLD AUTO: 0.06 THOUSAND/UL (ref 0–0.2)
IMM GRANULOCYTES NFR BLD AUTO: 1 % (ref 0–2)
LYMPHOCYTES # BLD AUTO: 2.17 THOUSANDS/ÂΜL (ref 0.6–4.47)
LYMPHOCYTES NFR BLD AUTO: 17 % (ref 14–44)
MCH RBC QN AUTO: 32 PG (ref 26.8–34.3)
MCHC RBC AUTO-ENTMCNC: 34.9 G/DL (ref 31.4–37.4)
MCV RBC AUTO: 92 FL (ref 82–98)
MONOCYTES # BLD AUTO: 1.24 THOUSAND/ÂΜL (ref 0.17–1.22)
MONOCYTES NFR BLD AUTO: 10 % (ref 4–12)
NEUTROPHILS # BLD AUTO: 8.83 THOUSANDS/ÂΜL (ref 1.85–7.62)
NEUTS SEG NFR BLD AUTO: 71 % (ref 43–75)
NRBC BLD AUTO-RTO: 0 /100 WBCS
PLATELET # BLD AUTO: 203 THOUSANDS/UL (ref 149–390)
PMV BLD AUTO: 10.5 FL (ref 8.9–12.7)
RBC # BLD AUTO: 4.41 MILLION/UL (ref 3.88–5.62)
WBC # BLD AUTO: 12.47 THOUSAND/UL (ref 4.31–10.16)

## 2023-05-14 RX ORDER — AMOXICILLIN AND CLAVULANATE POTASSIUM 875; 125 MG/1; MG/1
1 TABLET, FILM COATED ORAL EVERY 12 HOURS SCHEDULED
Qty: 22 TABLET | Refills: 0 | Status: CANCELLED | OUTPATIENT
Start: 2023-05-14 | End: 2023-05-25

## 2023-05-14 RX ORDER — AMOXICILLIN AND CLAVULANATE POTASSIUM 875; 125 MG/1; MG/1
1 TABLET, FILM COATED ORAL EVERY 8 HOURS
Qty: 33 TABLET | Refills: 0 | Status: SHIPPED | OUTPATIENT
Start: 2023-05-14 | End: 2023-05-25

## 2023-05-14 RX ADMIN — PIPERACILLIN SODIUM AND TAZOBACTAM SODIUM 3.38 G: 36; 4.5 INJECTION, POWDER, LYOPHILIZED, FOR SOLUTION INTRAVENOUS at 11:30

## 2023-05-14 RX ADMIN — PANTOPRAZOLE SODIUM 40 MG: 40 TABLET, DELAYED RELEASE ORAL at 05:16

## 2023-05-14 RX ADMIN — HEPARIN SODIUM 5000 UNITS: 5000 INJECTION INTRAVENOUS; SUBCUTANEOUS at 05:17

## 2023-05-14 RX ADMIN — PIPERACILLIN SODIUM AND TAZOBACTAM SODIUM 3.38 G: 36; 4.5 INJECTION, POWDER, LYOPHILIZED, FOR SOLUTION INTRAVENOUS at 05:19

## 2023-05-14 RX ADMIN — ACETAMINOPHEN 975 MG: 325 TABLET ORAL at 05:16

## 2023-05-14 NOTE — DISCHARGE SUMMARY
"  Discharge Summary - Viry Hubbard 65 y o  male MRN: [id number]    Unit/Bed#: [place] 2 -01 Encounter: [id number]    Admission Date:   Admission Orders (From admission, onward)     Ordered        23  Place in Observation  Once                        Admitting Diagnosis: Diverticulitis [K57 92]  Abdominal pain [R10 9]  Gallstones [K80 20]  Other microscopic hematuria [R31 29]    HPI:   Per Dr Rachel Mast:  Mic Lao is a 65 y o  male with a PMH GERD, diverticulosis, who p/w 1 day h/o RLQ abdominal pain and anorexia  Patient states this is the first time experiencing pain like this before  He notes focal pain to his RLQ that began yesterday as well as loss of appetite  Also notes feeling discomfort with urination, although denies gas with urination/voiding, blood, burning or stool from the urethra  He denies bloody BMs, and states his last BM was yesterday  At home Tmax was 100 5F, so he presented to urgent care with normal vitals at the time and was sent to ED for further evaluation  He remains afebrile currently and is nontender on exam, although recently administered analgesia  He has a FHx colon cancer  His father was dx at 36 and  at 46  Two sisters with breast cancer  Denies genetic markers  He states he began screening cscopes at 40years old  Last cscope  with only notable diverticulosis and recommendation for recall in 5 years  Otherwise healthy aside from GERD, no prior abdominal surgeries  ED workup was revealing of WBC 17, CT scan with sigmoid diverticulitis \"probable microperforation\", no abscess  \"    Procedures Performed: No orders of the defined types were placed in this encounter  Summary of Hospital Course:   Patient presented with first-time episode acute diverticulitis of the sigmoid colon  He was admitted with IV antibiotics and observation  His diet was slowly advanced, of which he was tolerating  His pain was well controlled    He was deemed stable for " discharge from a surgical standpoint on 5/14/2023  He was prescribed a course of Augmentin for a total of 14 days  He will call to schedule follow-up with general surgery in the outpatient setting  He will continue a low residue diet in the interim  He was informed he will need interval colonoscopy in approximately 6 to 8 weeks  Significant Findings, Care, Treatment and Services Provided: As noted, see daily progress notes for details  Complications: As noted, see daily progress notes for details  Discharge Diagnosis: Diverticulitis    Medical Problems     Resolved Problems  Date Reviewed: 5/12/2023   None         Condition at Discharge: stable         Discharge instructions/Information to patient and family:   See after visit summary for information provided to patient and family  Provisions for Follow-Up Care:  See after visit summary for information related to follow-up care and any pertinent home health orders  PCP: Supriya Cowart DO    Disposition: Home    Planned Readmission: No      Discharge Statement   I spent 30 minutes discharging the patient  This time was spent on the day of discharge  I had direct contact with the patient on the day of discharge  Additional documentation is required if more than 30 minutes were spent on discharge  Discharge Medications:  See after visit summary for reconciled discharge medications provided to patient and family

## 2023-05-14 NOTE — PLAN OF CARE
Problem: DISCHARGE PLANNING  Goal: Discharge to home or other facility with appropriate resources  Description: INTERVENTIONS:  - Identify barriers to discharge w/patient and caregiver  - Arrange for needed discharge resources and transportation as appropriate  - Identify discharge learning needs (meds, wound care, etc )  - Arrange for interpretive services to assist at discharge as needed  - Refer to Case Management Department for coordinating discharge planning if the patient needs post-hospital services based on physician/advanced practitioner order or complex needs related to functional status, cognitive ability, or social support system  Outcome: Progressing     Problem: METABOLIC, FLUID AND ELECTROLYTES - ADULT  Goal: Electrolytes maintained within normal limits  Description: INTERVENTIONS:  - Monitor labs and assess patient for signs and symptoms of electrolyte imbalances  - Administer electrolyte replacement as ordered  - Monitor response to electrolyte replacements, including repeat lab results as appropriate  - Instruct patient on fluid and nutrition as appropriate  Outcome: Progressing     Problem: GASTROINTESTINAL - ADULT  Goal: Maintains adequate nutritional intake  Description: INTERVENTIONS:  - Monitor percentage of each meal consumed  - Identify factors contributing to decreased intake, treat as appropriate  - Assist with meals as needed  - Monitor I&O, weight, and lab values if indicated  - Obtain nutrition services referral as needed  Outcome: Progressing     Problem: INFECTION - ADULT  Goal: Absence or prevention of progression during hospitalization  Description: INTERVENTIONS:  - Assess and monitor for signs and symptoms of infection  - Monitor lab/diagnostic results  - Monitor all insertion sites, i e  indwelling lines, tubes, and drains  - Monitor endotracheal if appropriate and nasal secretions for changes in amount and color  - [place] appropriate cooling/warming therapies per order  - Administer medications as ordered  - Instruct and encourage patient and family to use good hand hygiene technique  - Identify and instruct in appropriate isolation precautions for identified infection/condition  Outcome: Progressing  Problem: PAIN - ADULT  Goal: Verbalizes/displays adequate comfort level or baseline comfort level  Description: Interventions:  - Encourage patient to monitor pain and request assistance  - Assess pain using appropriate pain scale  - Administer analgesics based on type and severity of pain and evaluate response  - Implement non-pharmacological measures as appropriate and evaluate response  - Consider cultural and social influences on pain and pain management  - Notify physician/advanced practitioner if interventions unsuccessful or patient reports new pain  Outcome: Progressing

## 2023-05-14 NOTE — PLAN OF CARE
Problem: PAIN - ADULT  Goal: Verbalizes/displays adequate comfort level or baseline comfort level  Description: Interventions:  - Encourage patient to monitor pain and request assistance  - Assess pain using appropriate pain scale  - Administer analgesics based on type and severity of pain and evaluate response  - Implement non-pharmacological measures as appropriate and evaluate response  - Consider cultural and social influences on pain and pain management  - Notify physician/advanced practitioner if interventions unsuccessful or patient reports new pain  Outcome: Progressing     Problem: INFECTION - ADULT  Goal: Absence or prevention of progression during hospitalization  Description: INTERVENTIONS:  - Assess and monitor for signs and symptoms of infection  - Monitor lab/diagnostic results  - Monitor all insertion sites, i e  indwelling lines, tubes, and drains  - Monitor endotracheal if appropriate and nasal secretions for changes in amount and color  - [place] appropriate cooling/warming therapies per order  - Administer medications as ordered  - Instruct and encourage patient and family to use good hand hygiene technique  - Identify and instruct in appropriate isolation precautions for identified infection/condition  Outcome: Progressing     Problem: SAFETY ADULT  Goal: Patient will remain free of falls  Description: INTERVENTIONS:  - Educate patient/family on patient safety including physical limitations  - Instruct patient to call for assistance with activity   - Consult OT/PT to assist with strengthening/mobility   - Keep Call bell within reach  - Keep bed low and locked with side rails adjusted as appropriate  - Keep care items and personal belongings within reach  - Initiate and maintain comfort rounds  - Make Fall Risk Sign visible to staff  - Offer Toileting every 2 Hours, in advance of need  - Initiate/Maintain bed alarm  - Obtain necessary fall risk management equipment: non-slip socks  - Apply yellow socks and bracelet for high fall risk patients  - Consider moving patient to room near nurses station  Outcome: Progressing     Problem: DISCHARGE PLANNING  Goal: Discharge to home or other facility with appropriate resources  Description: INTERVENTIONS:  - Identify barriers to discharge w/patient and caregiver  - Arrange for needed discharge resources and transportation as appropriate  - Identify discharge learning needs (meds, wound care, etc )  - Arrange for interpretive services to assist at discharge as needed  - Refer to Case Management Department for coordinating discharge planning if the patient needs post-hospital services based on physician/advanced practitioner order or complex needs related to functional status, cognitive ability, or social support system  Outcome: Progressing     Problem: Knowledge Deficit  Goal: Patient/family/caregiver demonstrates understanding of disease process, treatment plan, medications, and discharge instructions  Description: Complete learning assessment and assess knowledge base    Interventions:  - Provide teaching at level of understanding  - Provide teaching via preferred learning methods  Outcome: Progressing

## 2023-05-14 NOTE — PROGRESS NOTES
Progress Note - Magalie Renae 65 y o  male MRN: [id number]    Unit/Bed#: [place] [address] -01 Encounter: [id number]      Assessment:  Magalie Renae is a 65 y o  male who p/w first episode acute sigmoid diverticulitis    VSS afebrile  WBC 13 from 17    Plan: On zosyn, will transition to augmentin on d/c  Tolerating low residual diet  Will need outpt f/u for rpt cscope in 6 weeks post acute phase  Plan for discharge today      Subjective:   No acute events overnight  Patient reports that pain has resolved  Patient is tolerating low residual diet without nausea, vomiting, or pain  He is having bowel function  He is voiding freely  He is ambulating  Objective:     Vitals: Blood pressure 155/89, pulse (!) 51, temperature 98 2 °F (36 8 °C), resp  rate 16, weight 87 5 kg (192 lb 14 4 oz), SpO2 96 %  ,Body mass index is 30 21 kg/m²  No intake or output data in the 24 hours ending 05/14/23 1008    Physical Exam:  General: No acute distress, alert and oriented  CV: Well perfused, regular rate and rhythm  Lungs: Normal work of breathing, no increased respiratory effort  Abdomen: Soft, non-tender, non-distended  Extremities: No edema, clubbing or cyanosis  Skin: Warm, dry         Invasive Devices     Peripheral Intravenous Line  Duration           Peripheral IV 05/12/23 Right;Ventral (anterior) Forearm 1 day                Lab, Imaging and other studies: I have personally reviewed pertinent reports      VTE Pharmacologic Prophylaxis: Heparin  VTE Mechanical Prophylaxis: sequential compression device Neuro

## 2023-05-14 NOTE — DISCHARGE INSTR - AVS FIRST PAGE
General Surgery Discharge Instructions:    A prescription for antibiotics has been sent to your pharmacy  Take as directed and do not miss any doses  Call to schedule a follow up appointment with surgery  You will also need an interval colonoscopy in approximately 6 - 8 weeks  For pain control, okay to alternate tylenol and motrin every six hours as needed  Continue a low fiber / low residue diet upon discharge  You will eventually transition to a high fiber diet to prevent further recurrence  For any questions or concerns, please call the office or return to the emergency room

## 2023-05-15 ENCOUNTER — TRANSITIONAL CARE MANAGEMENT (OUTPATIENT)
Dept: FAMILY MEDICINE CLINIC | Facility: CLINIC | Age: 63
End: 2023-05-15

## 2023-05-15 ENCOUNTER — OFFICE VISIT (OUTPATIENT)
Dept: FAMILY MEDICINE CLINIC | Facility: CLINIC | Age: 63
End: 2023-05-15

## 2023-05-15 VITALS
SYSTOLIC BLOOD PRESSURE: 134 MMHG | BODY MASS INDEX: 30.86 KG/M2 | RESPIRATION RATE: 16 BRPM | TEMPERATURE: 98.6 F | HEART RATE: 68 BPM | HEIGHT: 67 IN | WEIGHT: 196.6 LBS | DIASTOLIC BLOOD PRESSURE: 80 MMHG | OXYGEN SATURATION: 95 %

## 2023-05-15 DIAGNOSIS — K57.92 DIVERTICULITIS: Primary | ICD-10-CM

## 2023-05-15 RX ORDER — BACILLUS COAGULANS/INULIN 1B-250 MG
CAPSULE ORAL
COMMUNITY
Start: 2023-03-26

## 2023-05-15 NOTE — PROGRESS NOTES
Name: Governor Reynaga      : 1960      MRN: [id number]  Encounter Provider: Franky Valadez DO  Encounter Date: 5/15/2023   Encounter department: [address] [place] [address]   Patient to continue present treatment with Augmentin  Patient to follow-up with Dr Lenka Franklin, general surgeon on 2023 as scheduled  Discussed colonoscopy in 6 to 8 weeks  Recommend increase fluids and bland diet  Return to the office as needed  1  Diverticulitis          TCM Call     Date and time call was made  5/15/2023  9:43 AM    Patient was hospitialized at  [facility]    Date of Admission  23    Date of discharge  23    Diagnosis  Diverticulitis    Disposition  Home    Current Symptoms  None      TCM Call     Post hospital issues  None    Should patient be enrolled in anticoag monitoring? No    Scheduled for follow up? Yes    Patients specialists  Other (comment)    Other specialists names  Dr Zachariah Almeida    Other specialists contcat #  [phone]    Referrals needed  no    Did you obtain your prescribed medications  Yes    Do you need help managing your prescriptions or medications  No    Is transportation to your appointment needed  No    I have advised the patient to call PCP with any new or worsening symptoms  Megan Duarte, [facility]; Family; Friends    Are you recieving any outpatient services  No    Are you recieving home care services  No    Are you using any community resources  No    Current waiver services  No    Have you fallen in the last 12 months  No    Interperter language line needed  No            Subjective      Patient is being seen in follow-up from recent hospitalization at [facility] from 2023 until 2023 for acute diverticulitis  Patient was treated with IV antibiotics in the hospital and discharged home on Augmentin  Patient is feeling significantly better overall  Patient has a follow-up appointment scheduled with Dr Carri Moore, general surgeon on 5/31/2023  Patient was recommended to have follow-up colonoscopy in 6 to 8 weeks  Hospital records reviewed  Abdominal Pain  This is a new problem  The current episode started in the past 7 days  The problem occurs constantly  The problem has been gradually improving  The pain is located in the LLQ  The quality of the pain is aching and dull  The abdominal pain does not radiate  Pertinent negatives include no anorexia, constipation, diarrhea, dysuria, fever, frequency, hematochezia, hematuria, melena, nausea or vomiting  Nothing aggravates the pain  Treatments tried: antibiotics  The treatment provided significant relief  Prior diagnostic workup includes CT scan  Review of Systems   Constitutional: Negative for fever  Gastrointestinal: Positive for abdominal pain  Negative for anorexia, constipation, diarrhea, hematochezia, melena, nausea and vomiting  Genitourinary: Negative for dysuria, frequency and hematuria         Current Outpatient Medications on File Prior to Visit   Medication Sig   • amoxicillin-clavulanate (AUGMENTIN) 875-125 mg per tablet Take 1 tablet by mouth every 8 (eight) hours for 11 days   • aspirin 81 mg chewable tablet Chew daily   • atorvastatin (LIPITOR) 20 mg tablet TAKE 1 TABLET DAILY AS DIRECTED   • Bacillus Coagulans-Inulin (Probiotic) 1-250 BILLION-MG CAPS    • Cholecalciferol (VITAMIN D3) 2000 units capsule Take 3,000 Units by mouth    • Cyanocobalamin (VITAMIN B-12 CR PO) Take by mouth   • Multiple Vitamins-Minerals (MULTIVITAMIN ADULT) TABS Take by mouth   • Omega-3 Fatty Acids (FISH OIL) 1200 MG CAPS Take by mouth daily   • omeprazole (PriLOSEC) 40 MG capsule Take 40 mg by mouth daily   • Simethicone 250 MG CAPS    • econazole nitrate 1 % cream Apply topically 2 (two) times a day as needed   (Patient not taking: Reported on 5/12/2023)   • simethicone (MYLICON) 80 mg chewable tablet Chew 80 "mg every 6 (six) hours as needed for flatulence       Objective     /80 (BP Location: Left arm, Patient Position: Sitting, Cuff Size: Large)   Pulse 68   Temp 98 6 °F (37 °C) (Tympanic)   Resp 16   Ht 5' 7\" (1 702 m)   Wt 89 2 kg (196 lb 9 6 oz)   SpO2 95%   BMI 30 79 kg/m²     Physical Exam  Constitutional:       General: He is not in acute distress  Appearance: Normal appearance  He is not ill-appearing, toxic-appearing or diaphoretic  HENT:      Head: Normocephalic  Mouth/Throat:      Mouth: Mucous membranes are moist    Eyes:      General: No scleral icterus  Conjunctiva/sclera: Conjunctivae normal    Cardiovascular:      Rate and Rhythm: Normal rate and regular rhythm  Pulmonary:      Effort: Pulmonary effort is normal       Breath sounds: Normal breath sounds  Abdominal:      General: Bowel sounds are normal       Palpations: Abdomen is soft  Tenderness: There is abdominal tenderness  There is no right CVA tenderness, left CVA tenderness, guarding or rebound  Comments: Mild left lower quadrant tenderness  Musculoskeletal:      Cervical back: Neck supple  Right lower leg: No edema  Left lower leg: No edema  Lymphadenopathy:      Cervical: No cervical adenopathy  Skin:     General: Skin is warm and dry  Neurological:      General: No focal deficit present  Mental Status: He is alert and oriented to person, place, and time  Psychiatric:         Mood and Affect: Mood normal          Behavior: Behavior normal          Thought Content:  Thought content normal          Judgment: Judgment normal        Alisa Blind, DO  "

## 2023-05-31 ENCOUNTER — OFFICE VISIT (OUTPATIENT)
Dept: SURGERY | Facility: CLINIC | Age: 63
End: 2023-05-31

## 2023-05-31 VITALS
WEIGHT: 203.8 LBS | SYSTOLIC BLOOD PRESSURE: 143 MMHG | TEMPERATURE: 97.5 F | HEIGHT: 67 IN | BODY MASS INDEX: 31.99 KG/M2 | DIASTOLIC BLOOD PRESSURE: 85 MMHG | RESPIRATION RATE: 16 BRPM | HEART RATE: 63 BPM

## 2023-05-31 DIAGNOSIS — K57.92 DIVERTICULITIS: Primary | ICD-10-CM

## 2023-05-31 RX ORDER — SODIUM, POTASSIUM,MAG SULFATES 17.5-3.13G
2 SOLUTION, RECONSTITUTED, ORAL ORAL 2 TIMES DAILY
Qty: 177 ML | Refills: 0 | Status: SHIPPED | OUTPATIENT
Start: 2023-05-31

## 2023-06-02 NOTE — ASSESSMENT & PLAN NOTE
I reviewed his CT scan with him and showed him the images in the office  He does have sigmoid diverticulitis and a lot of other evidence of diverticulosis in his colon  There are a few droplets of air in the mesentery which could be a large diverticuli or could be from a micro perf  in general the recommendation is an episode of complicated diverticulitis recommend elective sigmoid colectomy in 3 months  However this was very mild microperforation and he is doing very well  We will repeat his colonoscopy for evaluation and based on that we will discuss options

## 2023-06-02 NOTE — PROGRESS NOTES
Assessment/Plan:    Diverticulitis  I reviewed his CT scan with him and showed him the images in the office  He does have sigmoid diverticulitis and a lot of other evidence of diverticulosis in his colon  There are a few droplets of air in the mesentery which could be a large diverticuli or could be from a micro perf  in general the recommendation is an episode of complicated diverticulitis recommend elective sigmoid colectomy in 3 months  However this was very mild microperforation and he is doing very well  We will repeat his colonoscopy for evaluation and based on that we will discuss options  Diagnoses and all orders for this visit:    Diverticulitis  -     Colonoscopy; Future  -     Na Sulfate-K Sulfate-Mg Sulf (Suprep Bowel Prep Kit) 17 5-3 13-1 6 GM/177ML SOLN; Take 2 Bottles by mouth 2 (two) times a day    Other orders  -     Diet NPO; Sips with meds; Standing  -     Void on call to OR; Standing  -     Insert peripheral IV; Standing          Subjective:      Patient ID: Katya Bosch is a 65 y o  male  Mr Yannick Randall is a 71-year-old gentleman here for follow-up from hospitalization for diverticulitis  His CT scan showed acute sigmoid diverticulitis with possible microperforation in the mesentery  He is done very well since being home  He denies abdominal pain  He is finished his antibiotics  This is his first episode of diverticulitis  His last colonoscopy was almost 3 years ago and showed moderate diverticulosis  The following portions of the patient's history were reviewed and updated as appropriate: allergies, current medications, past family history, past medical history, past social history, past surgical history and problem list     Review of Systems   Constitutional: Negative for chills and fever  HENT: Negative for ear pain and sore throat  Eyes: Negative for pain and visual disturbance  Respiratory: Negative for cough and shortness of breath      Cardiovascular: "Negative for chest pain and palpitations  Gastrointestinal: Negative for abdominal pain and vomiting  Genitourinary: Negative for dysuria and hematuria  Musculoskeletal: Negative for arthralgias and back pain  Skin: Negative for color change and rash  Neurological: Negative for seizures and syncope  Psychiatric/Behavioral: Negative for agitation and behavioral problems  All other systems reviewed and are negative  Objective:      /85   Pulse 63   Temp 97 5 °F (36 4 °C)   Resp 16   Ht 5' 7\" (1 702 m)   Wt 92 4 kg (203 lb 12 8 oz)   BMI 31 92 kg/m²          Physical Exam  Vitals and nursing note reviewed  Constitutional:       General: He is not in acute distress  Appearance: He is well-developed  He is not diaphoretic  HENT:      Head: Normocephalic and atraumatic  Eyes:      Pupils: Pupils are equal, round, and reactive to light  Cardiovascular:      Rate and Rhythm: Normal rate and regular rhythm  Heart sounds: Normal heart sounds  No murmur heard  Pulmonary:      Effort: Pulmonary effort is normal  No respiratory distress  Breath sounds: Normal breath sounds  No wheezing  Abdominal:      General: Bowel sounds are normal       Palpations: Abdomen is soft  Musculoskeletal:         General: Normal range of motion  Cervical back: Normal range of motion and neck supple  Skin:     General: Skin is warm and dry  Neurological:      Mental Status: He is alert and oriented to person, place, and time     Psychiatric:         Behavior: Behavior normal          "

## 2023-07-27 RX ORDER — ONDANSETRON 2 MG/ML
4 INJECTION INTRAMUSCULAR; INTRAVENOUS EVERY 6 HOURS PRN
Status: CANCELLED | OUTPATIENT
Start: 2023-07-27

## 2023-07-27 RX ORDER — SODIUM CHLORIDE 9 MG/ML
125 INJECTION, SOLUTION INTRAVENOUS CONTINUOUS
Status: CANCELLED | OUTPATIENT
Start: 2023-07-27

## 2023-07-28 ENCOUNTER — HOSPITAL ENCOUNTER (OUTPATIENT)
Dept: GASTROENTEROLOGY | Facility: HOSPITAL | Age: 63
Setting detail: OUTPATIENT SURGERY
End: 2023-07-28
Attending: SURGERY
Payer: COMMERCIAL

## 2023-07-28 ENCOUNTER — ANESTHESIA EVENT (OUTPATIENT)
Dept: GASTROENTEROLOGY | Facility: HOSPITAL | Age: 63
End: 2023-07-28

## 2023-07-28 ENCOUNTER — ANESTHESIA (OUTPATIENT)
Dept: GASTROENTEROLOGY | Facility: HOSPITAL | Age: 63
End: 2023-07-28

## 2023-07-28 VITALS
HEIGHT: 67 IN | BODY MASS INDEX: 31.86 KG/M2 | SYSTOLIC BLOOD PRESSURE: 154 MMHG | TEMPERATURE: 98.4 F | RESPIRATION RATE: 20 BRPM | OXYGEN SATURATION: 97 % | DIASTOLIC BLOOD PRESSURE: 83 MMHG | WEIGHT: 203 LBS | HEART RATE: 47 BPM

## 2023-07-28 DIAGNOSIS — K57.92 DIVERTICULITIS: ICD-10-CM

## 2023-07-28 PROCEDURE — 45385 COLONOSCOPY W/LESION REMOVAL: CPT | Performed by: SURGERY

## 2023-07-28 PROCEDURE — 88305 TISSUE EXAM BY PATHOLOGIST: CPT | Performed by: STUDENT IN AN ORGANIZED HEALTH CARE EDUCATION/TRAINING PROGRAM

## 2023-07-28 PROCEDURE — 45380 COLONOSCOPY AND BIOPSY: CPT | Performed by: SURGERY

## 2023-07-28 RX ORDER — ONDANSETRON 2 MG/ML
4 INJECTION INTRAMUSCULAR; INTRAVENOUS EVERY 6 HOURS PRN
Status: DISCONTINUED | OUTPATIENT
Start: 2023-07-28 | End: 2023-08-01 | Stop reason: HOSPADM

## 2023-07-28 RX ORDER — SODIUM CHLORIDE 9 MG/ML
125 INJECTION, SOLUTION INTRAVENOUS CONTINUOUS
Status: DISCONTINUED | OUTPATIENT
Start: 2023-07-28 | End: 2023-08-01 | Stop reason: HOSPADM

## 2023-07-28 RX ORDER — PROPOFOL 10 MG/ML
INJECTION, EMULSION INTRAVENOUS AS NEEDED
Status: DISCONTINUED | OUTPATIENT
Start: 2023-07-28 | End: 2023-07-28

## 2023-07-28 RX ORDER — GLYCOPYRROLATE 0.2 MG/ML
INJECTION INTRAMUSCULAR; INTRAVENOUS AS NEEDED
Status: DISCONTINUED | OUTPATIENT
Start: 2023-07-28 | End: 2023-07-28

## 2023-07-28 RX ADMIN — PROPOFOL 50 MG: 10 INJECTION, EMULSION INTRAVENOUS at 12:59

## 2023-07-28 RX ADMIN — PROPOFOL 70 MG: 10 INJECTION, EMULSION INTRAVENOUS at 13:05

## 2023-07-28 RX ADMIN — SODIUM CHLORIDE: 0.9 INJECTION, SOLUTION INTRAVENOUS at 13:03

## 2023-07-28 RX ADMIN — GLYCOPYRROLATE 0.2 MG: 0.2 INJECTION, SOLUTION INTRAMUSCULAR; INTRAVENOUS at 12:52

## 2023-07-28 RX ADMIN — PROPOFOL 120 MG: 10 INJECTION, EMULSION INTRAVENOUS at 12:51

## 2023-07-28 RX ADMIN — PROPOFOL 60 MG: 10 INJECTION, EMULSION INTRAVENOUS at 13:09

## 2023-07-28 RX ADMIN — SODIUM CHLORIDE 125 ML/HR: 0.9 INJECTION, SOLUTION INTRAVENOUS at 12:24

## 2023-07-28 RX ADMIN — PROPOFOL 50 MG: 10 INJECTION, EMULSION INTRAVENOUS at 13:02

## 2023-07-28 RX ADMIN — PROPOFOL 50 MG: 10 INJECTION, EMULSION INTRAVENOUS at 12:55

## 2023-07-28 NOTE — ANESTHESIA POSTPROCEDURE EVALUATION
Post-Op Assessment Note    CV Status:  Stable    Pain management: adequate     Mental Status:  Alert and awake   Hydration Status:  Euvolemic   PONV Controlled:  Controlled   Airway Patency:  Patent      Post Op Vitals Reviewed: Yes      Staff: Anesthesiologist         No notable events documented.     BP      Temp      Pulse    Resp      SpO2      /83   Pulse (!) 47   Temp 98.4 °F (36.9 °C) (Temporal)   Resp 20   Ht 5' 7" (1.702 m)   Wt 92.1 kg (203 lb)   SpO2 97%   BMI 31.79 kg/m²

## 2023-07-28 NOTE — H&P
H&P EXAM - Outpatient Endoscopy   Yanci Pimentel 65 y.o. male MRN: [id number]    [address] [place] [address] GI LAB PRE/POST   Encounter: [id number]        Impression: History of diverticulitis with microperforation    Plan: Colonoscopy    Chief Complaint: Recent episode of diverticulitis with microperforation.   Last colonoscopy greater than 3 years ago    Physical Exam: AAOx3   Chest: CTA   Heart: S1S2

## 2023-07-28 NOTE — ANESTHESIA PREPROCEDURE EVALUATION
Procedure:  COLONOSCOPY    Relevant Problems   CARDIO   (+) Hyperlipidemia      GI/HEPATIC   (+) GERD without esophagitis        Physical Exam    Airway    Mallampati score: III  TM Distance: >3 FB  Neck ROM: full     Dental   No notable dental hx     Cardiovascular  Rhythm: regular, Rate: normal, Cardiovascular exam normal    Pulmonary  Pulmonary exam normal Breath sounds clear to auscultation,     Other Findings        Anesthesia Plan  ASA Score- 2     Anesthesia Type- general with ASA Monitors. Additional Monitors:   Airway Plan:           Plan Factors-    Chart reviewed. Patient summary reviewed. Induction-     Postoperative Plan-     Informed Consent- Anesthetic plan and risks discussed with patient.

## 2023-07-28 NOTE — PERIOPERATIVE NURSING NOTE
Grounding pad placed R thigh.  Skin WNL before placement and after removal    Hot Snare/ Polypectomy    Forced Coag 2.0 ; ENDO CUT 2,  setting confirmed with MD

## 2023-08-01 PROCEDURE — 88305 TISSUE EXAM BY PATHOLOGIST: CPT | Performed by: STUDENT IN AN ORGANIZED HEALTH CARE EDUCATION/TRAINING PROGRAM

## 2025-06-05 ENCOUNTER — TELEPHONE (OUTPATIENT)
Dept: FAMILY MEDICINE CLINIC | Facility: CLINIC | Age: 65
End: 2025-06-05